# Patient Record
Sex: FEMALE | Race: OTHER | Employment: OTHER | URBAN - METROPOLITAN AREA
[De-identification: names, ages, dates, MRNs, and addresses within clinical notes are randomized per-mention and may not be internally consistent; named-entity substitution may affect disease eponyms.]

---

## 2017-04-10 ENCOUNTER — OFFICE VISIT (OUTPATIENT)
Dept: OBGYN CLINIC | Facility: CLINIC | Age: 62
End: 2017-04-10

## 2017-04-10 VITALS
DIASTOLIC BLOOD PRESSURE: 78 MMHG | SYSTOLIC BLOOD PRESSURE: 102 MMHG | BODY MASS INDEX: 28.64 KG/M2 | WEIGHT: 174 LBS | HEART RATE: 71 BPM | HEIGHT: 65.25 IN

## 2017-04-10 DIAGNOSIS — Z13.220 SCREENING CHOLESTEROL LEVEL: ICD-10-CM

## 2017-04-10 DIAGNOSIS — Z12.4 CERVICAL CANCER SCREENING: ICD-10-CM

## 2017-04-10 DIAGNOSIS — Z01.419 WELL FEMALE EXAM WITH ROUTINE GYNECOLOGICAL EXAM: Primary | ICD-10-CM

## 2017-04-10 DIAGNOSIS — Z13.0 SCREENING FOR DEFICIENCY ANEMIA: ICD-10-CM

## 2017-04-10 DIAGNOSIS — Z13.1 SCREENING FOR DIABETES MELLITUS: ICD-10-CM

## 2017-04-10 DIAGNOSIS — L29.3 PERINEAL ITCHING, FEMALE: ICD-10-CM

## 2017-04-10 PROCEDURE — 88175 CYTOPATH C/V AUTO FLUID REDO: CPT | Performed by: OBSTETRICS & GYNECOLOGY

## 2017-04-10 PROCEDURE — 99386 PREV VISIT NEW AGE 40-64: CPT | Performed by: OBSTETRICS & GYNECOLOGY

## 2017-04-10 PROCEDURE — 87624 HPV HI-RISK TYP POOLED RSLT: CPT | Performed by: OBSTETRICS & GYNECOLOGY

## 2017-04-10 RX ORDER — MINOCYCLINE HYDROCHLORIDE 100 MG/1
CAPSULE ORAL
Refills: 1 | COMMUNITY
Start: 2017-02-07 | End: 2018-06-01

## 2017-04-10 RX ORDER — OLOPATADINE HYDROCHLORIDE 7 MG/ML
SOLUTION OPHTHALMIC
Refills: 6 | COMMUNITY
Start: 2017-02-13 | End: 2019-02-28

## 2017-04-10 RX ORDER — CLINDAMYCIN PHOSPHATE 11.9 MG/ML
SOLUTION TOPICAL
Refills: 0 | COMMUNITY
Start: 2017-04-04 | End: 2017-12-14 | Stop reason: ALTCHOICE

## 2017-04-10 RX ORDER — CLOTRIMAZOLE AND BETAMETHASONE DIPROPIONATE 10; .64 MG/G; MG/G
1 CREAM TOPICAL 2 TIMES DAILY
Qty: 45 G | Refills: 2 | Status: SHIPPED | OUTPATIENT
Start: 2017-04-10 | End: 2018-06-01

## 2017-04-10 RX ORDER — CLOBETASOL PROPIONATE 0.46 MG/ML
SOLUTION TOPICAL
Refills: 2 | COMMUNITY
Start: 2017-04-04 | End: 2018-06-01

## 2017-04-10 NOTE — PROGRESS NOTES
GYN H&P     4/10/2017  2:16 PM    CC: Patient is here for annual    HPI: patient is a [address] old  here for her annual gyn exam.   She has no complaints except perineal itching especially when warm/sweating.  Denies any pelvic pain or irregular vagin runny nose, epistaxis, throat pain or sore throat  Respiratory: denies SOB, dyspnea, cough or wheezing  Cardiovascular: denies chest pain, palpitations, exercise intolerance   GI: denies abdominal pain, diarrhea, constipation  : no complaints, denies dys without edema        A/P: Patient is 63year old female with no complaints. Here for well woman exam.   1. Well female exam with routine gynecological exam    2. Cervical cancer screening    3. Perineal itching, female    4.  Screening for deficiency anemia

## 2017-04-10 NOTE — PROGRESS NOTES
GYN H&P     4/10/2017  2:13 PM    CC: Patient is here for ***    HPI: patient is a 63year old  here for her annual gyn exam.   She has no*** complaints. Menses are regular. Denies any pelvic pain or irregular vaginal discharge.    Contraception: *** dyspnea, cough or wheezing  Cardiovascular: denies chest pain, palpitations, exercise intolerance   GI: denies abdominal pain, diarrhea, constipation  : no complaints***, denies dysuria, increased urinary frequency.  Menses ***regular, no dysmenorrhea, no exam.   1. Well female exam with routine gynecological exam    2. Cervical cancer screening    3. Perineal itching, female        Patient counseled on diet/exercise       1.  Well female exam with routine gynecological exam  ***  - MECCA DIGITAL SCRN BILAT W/

## 2017-04-12 NOTE — PROGRESS NOTES
Quick Note:    Normal pap, negative HPV. Patient notified via confidential voice mail.  F/U 1 year or prn.  ______

## 2017-04-19 ENCOUNTER — LAB ENCOUNTER (OUTPATIENT)
Dept: LAB | Age: 62
End: 2017-04-19
Attending: OBSTETRICS & GYNECOLOGY
Payer: COMMERCIAL

## 2017-04-19 ENCOUNTER — HOSPITAL ENCOUNTER (OUTPATIENT)
Dept: MAMMOGRAPHY | Age: 62
Discharge: HOME | End: 2017-04-19
Attending: OBSTETRICS & GYNECOLOGY
Payer: COMMERCIAL

## 2017-04-19 DIAGNOSIS — Z01.419 WELL FEMALE EXAM WITH ROUTINE GYNECOLOGICAL EXAM: ICD-10-CM

## 2017-04-19 DIAGNOSIS — Z13.1 SCREENING FOR DIABETES MELLITUS: ICD-10-CM

## 2017-04-19 DIAGNOSIS — Z13.0 SCREENING FOR DEFICIENCY ANEMIA: ICD-10-CM

## 2017-04-19 DIAGNOSIS — Z13.220 SCREENING CHOLESTEROL LEVEL: ICD-10-CM

## 2017-04-19 PROCEDURE — 80061 LIPID PANEL: CPT

## 2017-04-19 PROCEDURE — 36415 COLL VENOUS BLD VENIPUNCTURE: CPT

## 2017-04-19 PROCEDURE — 80053 COMPREHEN METABOLIC PANEL: CPT

## 2017-04-19 PROCEDURE — 77067 SCR MAMMO BI INCL CAD: CPT

## 2017-04-19 PROCEDURE — 85025 COMPLETE CBC W/AUTO DIFF WBC: CPT

## 2017-04-19 NOTE — PROGRESS NOTES
Quick Note:    Patient notified of results and recommendations.  Patient looking for PCP, names given.  ______

## 2017-04-28 ENCOUNTER — HOSPITAL ENCOUNTER (OUTPATIENT)
Dept: ULTRASOUND IMAGING | Age: 62
Discharge: HOME | End: 2017-04-28
Attending: OBSTETRICS & GYNECOLOGY
Payer: COMMERCIAL

## 2017-04-28 ENCOUNTER — HOSPITAL ENCOUNTER (OUTPATIENT)
Dept: MAMMOGRAPHY | Age: 62
Discharge: HOME | End: 2017-04-28
Attending: OBSTETRICS & GYNECOLOGY
Payer: COMMERCIAL

## 2017-04-28 DIAGNOSIS — R92.8 ABNORMAL MAMMOGRAM OF LEFT BREAST: ICD-10-CM

## 2017-04-28 PROCEDURE — 77061 BREAST TOMOSYNTHESIS UNI: CPT

## 2017-04-28 PROCEDURE — 76642 ULTRASOUND BREAST LIMITED: CPT

## 2017-04-28 PROCEDURE — 77065 DX MAMMO INCL CAD UNI: CPT

## 2017-12-14 ENCOUNTER — OFFICE VISIT (OUTPATIENT)
Dept: FAMILY MEDICINE CLINIC | Facility: CLINIC | Age: 62
End: 2017-12-14

## 2017-12-14 VITALS
RESPIRATION RATE: 16 BRPM | SYSTOLIC BLOOD PRESSURE: 108 MMHG | HEART RATE: 72 BPM | DIASTOLIC BLOOD PRESSURE: 70 MMHG | BODY MASS INDEX: 30.45 KG/M2 | TEMPERATURE: 97 F | HEIGHT: 65.25 IN | WEIGHT: 185 LBS

## 2017-12-14 DIAGNOSIS — N95.1 HOT FLASHES DUE TO MENOPAUSE: ICD-10-CM

## 2017-12-14 DIAGNOSIS — H81.13 BPPV (BENIGN PAROXYSMAL POSITIONAL VERTIGO), BILATERAL: Primary | ICD-10-CM

## 2017-12-14 DIAGNOSIS — J30.89 CHRONIC NON-SEASONAL ALLERGIC RHINITIS, UNSPECIFIED TRIGGER: ICD-10-CM

## 2017-12-14 PROCEDURE — 99204 OFFICE O/P NEW MOD 45 MIN: CPT | Performed by: FAMILY MEDICINE

## 2017-12-14 NOTE — PROGRESS NOTES
Patient presents with:  Dizziness: feels like the room is spinning off & on x 3 wks  Runny Nose: constant runny nose for over a month. Tried OTC Claritin with no relief. Imm/Inj: declines the flu vaccine today.       HPI:  Jasbir Aggarwal is a 63year old Smoking status: Never Smoker    Smokeless tobacco: Never Used    Alcohol use Yes  0.0 oz/week     Comment: 1 x week    Drug use: No    Sexual activity: Yes    Partners: Male     Other Topics Concern    Caffeine Concern Yes    Comment: 1 cup of coffee x da 3. Take Allegra (fexofenadine) or Zyrtec (ceterizine) for pills. May take in combination with a nasal steroid over the counter like Flonase (fluticasone)  4. Wash your sheets, dust regularly, change your furnace filter with each season    5.  Dress in layer The healthcare provider will ask about your symptoms and your medical history. He or she will examine you. You may have hearing and balance tests. As part of the exam, your healthcare provider may have you move your head and body in certain ways.  If you ha

## 2017-12-14 NOTE — PATIENT INSTRUCTIONS
1. Limit movement of your head, move slowly  2. Do Epley Maneuver to help your vertigo    3. Take Allegra (fexofenadine) or Zyrtec (ceterizine) for pills. May take in combination with a nasal steroid over the counter like Flonase (fluticasone)  4.  Wash you medical history. He or she will examine you. You may have hearing and balance tests. As part of the exam, your healthcare provider may have you move your head and body in certain ways. If you have BPPV, the movements can bring on vertigo.  Your provider capo

## 2018-01-26 ENCOUNTER — IMMUNIZATION (OUTPATIENT)
Dept: FAMILY MEDICINE CLINIC | Facility: CLINIC | Age: 63
End: 2018-01-26

## 2018-01-26 DIAGNOSIS — Z23 NEED FOR VACCINATION: ICD-10-CM

## 2018-01-26 PROCEDURE — 90471 IMMUNIZATION ADMIN: CPT | Performed by: NURSE PRACTITIONER

## 2018-01-26 PROCEDURE — 90686 IIV4 VACC NO PRSV 0.5 ML IM: CPT | Performed by: NURSE PRACTITIONER

## 2018-01-29 ENCOUNTER — MED REC SCAN ONLY (OUTPATIENT)
Dept: FAMILY MEDICINE CLINIC | Facility: CLINIC | Age: 63
End: 2018-01-29

## 2018-06-01 ENCOUNTER — OFFICE VISIT (OUTPATIENT)
Dept: OBGYN CLINIC | Facility: CLINIC | Age: 63
End: 2018-06-01

## 2018-06-01 ENCOUNTER — LAB ENCOUNTER (OUTPATIENT)
Dept: LAB | Age: 63
End: 2018-06-01
Attending: OBSTETRICS & GYNECOLOGY
Payer: COMMERCIAL

## 2018-06-01 VITALS
HEIGHT: 66 IN | BODY MASS INDEX: 30.7 KG/M2 | SYSTOLIC BLOOD PRESSURE: 110 MMHG | WEIGHT: 191 LBS | DIASTOLIC BLOOD PRESSURE: 80 MMHG

## 2018-06-01 DIAGNOSIS — Z12.39 BREAST CANCER SCREENING: ICD-10-CM

## 2018-06-01 DIAGNOSIS — Z13.9 SCREENING FOR CONDITION: ICD-10-CM

## 2018-06-01 DIAGNOSIS — L29.3 PERINEAL ITCHING, FEMALE: ICD-10-CM

## 2018-06-01 DIAGNOSIS — Z12.4 CERVICAL CANCER SCREENING: ICD-10-CM

## 2018-06-01 DIAGNOSIS — Z01.419 WELL FEMALE EXAM WITH ROUTINE GYNECOLOGICAL EXAM: Primary | ICD-10-CM

## 2018-06-01 PROCEDURE — 36415 COLL VENOUS BLD VENIPUNCTURE: CPT

## 2018-06-01 PROCEDURE — 88175 CYTOPATH C/V AUTO FLUID REDO: CPT | Performed by: OBSTETRICS & GYNECOLOGY

## 2018-06-01 PROCEDURE — 99396 PREV VISIT EST AGE 40-64: CPT | Performed by: OBSTETRICS & GYNECOLOGY

## 2018-06-01 PROCEDURE — 82306 VITAMIN D 25 HYDROXY: CPT

## 2018-06-01 RX ORDER — CLOTRIMAZOLE AND BETAMETHASONE DIPROPIONATE 10; .64 MG/G; MG/G
1 CREAM TOPICAL 2 TIMES DAILY
Qty: 45 G | Refills: 2 | Status: SHIPPED | OUTPATIENT
Start: 2018-06-01 | End: 2019-02-28

## 2018-06-01 NOTE — PROGRESS NOTES
GYN H&P     2018  9:21 AM    CC: Patient is here for annual    HPI: patient is a 63year old  here for her annual gyn exam.   She has no complaints. Menopausal, Denies any pelvic pain or irregular vaginal discharge.    Contraception: maurice GAONA changes, denies headaches  ENT: no complaints, denies earaches, runny nose, epistaxis, throat pain or sore throat  Respiratory: denies SOB, dyspnea, cough or wheezing  Cardiovascular: denies chest pain, palpitations, exercise intolerance   GI: denies abdom tender without edema        A/P: Patient is 63year old female with no complaints. Here for well woman exam.   1. Well female exam with routine gynecological exam    2. Breast cancer screening    3. Cervical cancer screening    4.  Perineal itching, female

## 2018-06-13 ENCOUNTER — HOSPITAL ENCOUNTER (OUTPATIENT)
Dept: MAMMOGRAPHY | Age: 63
Discharge: HOME | End: 2018-06-13
Attending: OBSTETRICS & GYNECOLOGY
Payer: COMMERCIAL

## 2018-06-13 DIAGNOSIS — Z01.419 WELL FEMALE EXAM WITH ROUTINE GYNECOLOGICAL EXAM: ICD-10-CM

## 2018-06-13 DIAGNOSIS — Z12.39 BREAST CANCER SCREENING: ICD-10-CM

## 2018-06-13 PROCEDURE — 77067 SCR MAMMO BI INCL CAD: CPT | Performed by: OBSTETRICS & GYNECOLOGY

## 2018-06-13 PROCEDURE — 77063 BREAST TOMOSYNTHESIS BI: CPT | Performed by: OBSTETRICS & GYNECOLOGY

## 2018-11-21 ENCOUNTER — OFFICE VISIT (OUTPATIENT)
Dept: FAMILY MEDICINE CLINIC | Facility: CLINIC | Age: 63
End: 2018-11-21
Payer: COMMERCIAL

## 2018-11-21 VITALS
OXYGEN SATURATION: 97 % | BODY MASS INDEX: 31.82 KG/M2 | HEIGHT: 66 IN | SYSTOLIC BLOOD PRESSURE: 102 MMHG | DIASTOLIC BLOOD PRESSURE: 58 MMHG | TEMPERATURE: 98 F | WEIGHT: 198 LBS | HEART RATE: 68 BPM | RESPIRATION RATE: 18 BRPM

## 2018-11-21 DIAGNOSIS — R42 VERTIGO: ICD-10-CM

## 2018-11-21 DIAGNOSIS — J01.40 ACUTE PANSINUSITIS, RECURRENCE NOT SPECIFIED: Primary | ICD-10-CM

## 2018-11-21 PROCEDURE — 99213 OFFICE O/P EST LOW 20 MIN: CPT | Performed by: PHYSICIAN ASSISTANT

## 2018-11-21 RX ORDER — DOXYCYCLINE HYCLATE 100 MG/1
100 CAPSULE ORAL 2 TIMES DAILY
Qty: 14 CAPSULE | Refills: 0 | Status: SHIPPED | OUTPATIENT
Start: 2018-11-21 | End: 2018-11-28

## 2018-11-21 RX ORDER — MECLIZINE HYDROCHLORIDE CHEWABLE TABLETS 25 MG/1
25 TABLET, CHEWABLE ORAL 3 TIMES DAILY PRN
Qty: 15 TABLET | Refills: 0 | Status: SHIPPED | OUTPATIENT
Start: 2018-11-21 | End: 2019-02-28

## 2018-11-21 RX ORDER — METHYLPREDNISOLONE 4 MG/1
TABLET ORAL
Qty: 1 PACKAGE | Refills: 0 | Status: SHIPPED | OUTPATIENT
Start: 2018-11-21 | End: 2019-02-28

## 2018-11-21 NOTE — PATIENT INSTRUCTIONS
1. Doxycycline  2. Medrol- No NSAIDs  3. Meclizine  4. Follow up with PCP in 48 hours  5. If worsening symptoms to ER  6.  Continue excercises    Benign Paroxysmal Positional Vertigo/ Sinusitis     Your health care provider may move your head in certain w The healthcare provider will ask about your symptoms and your medical history. He or she will examine you. You may have hearing and balance tests. As part of the exam, your healthcare provider may have you move your head and body in certain ways.  If you ha The sinuses are air-filled spaces within the bones of the face. They connect to the inside of the nose. Sinusitis is an inflammation of the tissue that lines the sinuses. Sinusitis can occur during a cold.  It can also happen due to allergies to pollens and · Do not use nasal rinses or irrigation during an acute sinus infection, unless your healthcare provider tells you to. Rinsing may spread the infection to other areas in your sinuses.   · Use acetaminophen or ibuprofen to control pain, unless another pain m

## 2018-11-21 NOTE — PROGRESS NOTES
CHIEF COMPLAINT:     Patient presents with:  Vertigo: X 1-6 day  Nasal Congestion: Sinus pressure, left ear pain, left side of the neck swelling, X 6 days       HPI:   Mag James is a 63year old female who presents with complaints of nasal congestion • Hemorrhoids    • Wears glasses       Social History:  Social History    Tobacco Use      Smoking status: Never Smoker      Smokeless tobacco: Never Used    Alcohol use:  Yes      Alcohol/week: 0.0 oz      Comment: 1 x week    Drug use: No       REVIEW OF NEURO: A&Ox3. CN II-XII intact. No focal deficits. Coordination and Gait normal.  Kernig and Brudzinski's are negative. Patient does have previous history of BPPV previously treated by PCP with Flonase, antihistamine, and exercises.      ASSESSMENT AN Your primary healthcare provider may diagnose and treat your BPPV. Or you may see an ear, nose, and throat doctor (otolaryngologist). In some cases, you may see a nervous system doctor (neurologist).   The healthcare provider will ask about your symptoms an The sinuses are air-filled spaces within the bones of the face. They connect to the inside of the nose. Sinusitis is an inflammation of the tissue that lines the sinuses. Sinusitis can occur during a cold.  It can also happen due to allergies to pollens and · Do not use nasal rinses or irrigation during an acute sinus infection, unless your healthcare provider tells you to. Rinsing may spread the infection to other areas in your sinuses.   · Use acetaminophen or ibuprofen to control pain, unless another pain m

## 2019-01-14 ENCOUNTER — TELEPHONE (OUTPATIENT)
Dept: OBGYN CLINIC | Facility: CLINIC | Age: 64
End: 2019-01-14

## 2019-01-14 DIAGNOSIS — N95.0 POST-MENOPAUSE BLEEDING: Primary | ICD-10-CM

## 2019-01-14 NOTE — TELEPHONE ENCOUNTER
Patient informed. Verbalized understanding.   My chart message with central scheduling number sent to patient per her request.

## 2019-01-14 NOTE — TELEPHONE ENCOUNTER
Recommend pelvic ultrasound (with [facility]) at pt convenience  And   Then appropriates follow up visit after that

## 2019-01-14 NOTE — TELEPHONE ENCOUNTER
Pt noticed bloody discharge on Jan 9th last only 1 day. Felt slight cramping for couple days after. Pt wanting to be seen today.

## 2019-01-14 NOTE — TELEPHONE ENCOUNTER
67 y/o called c/o one day PMB. She states she went to the washroom on 01/09/19 and when she wiped she had bleeding. It only happened one time, the rest of the day she felt like she did when she had her menses. She stated she had some cramping as well.  Stat

## 2019-01-20 ENCOUNTER — HOSPITAL ENCOUNTER (OUTPATIENT)
Dept: ULTRASOUND IMAGING | Age: 64
Discharge: HOME | End: 2019-01-20
Attending: OBSTETRICS & GYNECOLOGY
Payer: COMMERCIAL

## 2019-01-20 DIAGNOSIS — N95.0 POST-MENOPAUSE BLEEDING: ICD-10-CM

## 2019-01-20 PROCEDURE — 76830 TRANSVAGINAL US NON-OB: CPT | Performed by: OBSTETRICS & GYNECOLOGY

## 2019-01-20 PROCEDURE — 76856 US EXAM PELVIC COMPLETE: CPT | Performed by: OBSTETRICS & GYNECOLOGY

## 2019-01-29 NOTE — PROGRESS NOTES
Patient informed of results. Verbalized understanding. No further questions or concerns. Call transferred to [facility] to schedule.

## 2019-02-28 ENCOUNTER — OFFICE VISIT (OUTPATIENT)
Dept: OBGYN CLINIC | Facility: CLINIC | Age: 64
End: 2019-02-28
Payer: COMMERCIAL

## 2019-02-28 VITALS
DIASTOLIC BLOOD PRESSURE: 80 MMHG | WEIGHT: 196 LBS | HEIGHT: 66 IN | BODY MASS INDEX: 31.5 KG/M2 | SYSTOLIC BLOOD PRESSURE: 124 MMHG

## 2019-02-28 DIAGNOSIS — N95.0 POSTMENOPAUSAL BLEEDING: Primary | ICD-10-CM

## 2019-02-28 PROCEDURE — 88305 TISSUE EXAM BY PATHOLOGIST: CPT | Performed by: OBSTETRICS & GYNECOLOGY

## 2019-02-28 PROCEDURE — 58100 BIOPSY OF UTERUS LINING: CPT | Performed by: OBSTETRICS & GYNECOLOGY

## 2019-02-28 RX ORDER — LIDOCAINE HYDROCHLORIDE 10 MG/ML
10 INJECTION, SOLUTION INFILTRATION; PERINEURAL ONCE
Status: COMPLETED | OUTPATIENT
Start: 2019-02-28 | End: 2019-02-28

## 2019-02-28 RX ADMIN — LIDOCAINE HYDROCHLORIDE 10 ML: 10 INJECTION, SOLUTION INFILTRATION; PERINEURAL at 13:34:00

## 2019-02-28 NOTE — PROGRESS NOTES
2Endometrial Biopsy    Pre-Procedure Care:   Consent was obtained. Procedure/risks were explained. Questions were answered. Correct patient was identified. Correct side and site were confirmed.     Pregnancy Results: negative from menopause   Birth cont

## 2019-04-10 ENCOUNTER — LAB ENCOUNTER (OUTPATIENT)
Dept: LAB | Age: 64
End: 2019-04-10
Attending: FAMILY MEDICINE
Payer: COMMERCIAL

## 2019-04-10 ENCOUNTER — OFFICE VISIT (OUTPATIENT)
Dept: FAMILY MEDICINE CLINIC | Facility: CLINIC | Age: 64
End: 2019-04-10
Payer: COMMERCIAL

## 2019-04-10 VITALS
HEART RATE: 68 BPM | HEIGHT: 66 IN | BODY MASS INDEX: 31.34 KG/M2 | TEMPERATURE: 98 F | SYSTOLIC BLOOD PRESSURE: 122 MMHG | RESPIRATION RATE: 16 BRPM | WEIGHT: 195 LBS | DIASTOLIC BLOOD PRESSURE: 78 MMHG

## 2019-04-10 DIAGNOSIS — E55.9 VITAMIN D INSUFFICIENCY: ICD-10-CM

## 2019-04-10 DIAGNOSIS — Z13.220 SCREENING CHOLESTEROL LEVEL: ICD-10-CM

## 2019-04-10 DIAGNOSIS — J30.1 SEASONAL ALLERGIC RHINITIS DUE TO POLLEN: ICD-10-CM

## 2019-04-10 DIAGNOSIS — K20.90 ESOPHAGITIS, UNSPECIFIED: ICD-10-CM

## 2019-04-10 DIAGNOSIS — J06.9 VIRAL URI: Primary | ICD-10-CM

## 2019-04-10 DIAGNOSIS — H10.13 ALLERGIC CONJUNCTIVITIS OF BOTH EYES: ICD-10-CM

## 2019-04-10 PROCEDURE — 82306 VITAMIN D 25 HYDROXY: CPT | Performed by: FAMILY MEDICINE

## 2019-04-10 PROCEDURE — 80053 COMPREHEN METABOLIC PANEL: CPT | Performed by: FAMILY MEDICINE

## 2019-04-10 PROCEDURE — 85025 COMPLETE CBC W/AUTO DIFF WBC: CPT | Performed by: FAMILY MEDICINE

## 2019-04-10 PROCEDURE — 99213 OFFICE O/P EST LOW 20 MIN: CPT | Performed by: FAMILY MEDICINE

## 2019-04-10 PROCEDURE — 80061 LIPID PANEL: CPT | Performed by: FAMILY MEDICINE

## 2019-04-10 PROCEDURE — 36415 COLL VENOUS BLD VENIPUNCTURE: CPT | Performed by: FAMILY MEDICINE

## 2019-04-10 RX ORDER — OLOPATADINE HYDROCHLORIDE 2 MG/ML
1 SOLUTION/ DROPS OPHTHALMIC 2 TIMES DAILY PRN
Qty: 1 BOTTLE | Refills: 0 | Status: SHIPPED | OUTPATIENT
Start: 2019-04-10 | End: 2019-10-27 | Stop reason: ALTCHOICE

## 2019-04-10 RX ORDER — FLUTICASONE PROPIONATE 50 MCG
2 SPRAY, SUSPENSION (ML) NASAL DAILY
Qty: 3 BOTTLE | Refills: 3 | Status: SHIPPED | OUTPATIENT
Start: 2019-04-10 | End: 2020-04-04

## 2019-04-10 NOTE — PROGRESS NOTES
[facility] Visit Note  4/10/2019      Subjective:      Patient ID: Cherise Hendricks is a 66year old female.     Chief Complaint:  Patient presents with:  Sinus Problem: x 3-4 days c/o having sinus pressure, drainage down back of her throat,  heada Dispense: 1 Bottle; Refill: 0  - Fluticasone Propionate 50 MCG/ACT Nasal Suspension; 2 sprays by Each Nare route daily. Dispense: 3 Bottle; Refill: 3  -may add decongestant    4. Vitamin D insufficiency  - VITAMIN D, 25-HYDROXY; Future    5.  Esophagitis,

## 2019-04-11 PROBLEM — E78.2 MIXED HYPERLIPIDEMIA: Status: ACTIVE | Noted: 2019-01-01

## 2019-06-12 ENCOUNTER — OFFICE VISIT (OUTPATIENT)
Dept: FAMILY MEDICINE CLINIC | Facility: CLINIC | Age: 64
End: 2019-06-12
Payer: COMMERCIAL

## 2019-06-12 VITALS — SYSTOLIC BLOOD PRESSURE: 138 MMHG | DIASTOLIC BLOOD PRESSURE: 82 MMHG

## 2019-06-12 DIAGNOSIS — Z01.30 BP CHECK: Primary | ICD-10-CM

## 2019-10-27 ENCOUNTER — OFFICE VISIT (OUTPATIENT)
Dept: FAMILY MEDICINE CLINIC | Facility: CLINIC | Age: 64
End: 2019-10-27
Payer: COMMERCIAL

## 2019-10-27 VITALS
OXYGEN SATURATION: 97 % | WEIGHT: 199 LBS | RESPIRATION RATE: 20 BRPM | BODY MASS INDEX: 31.98 KG/M2 | HEIGHT: 66 IN | TEMPERATURE: 99 F | DIASTOLIC BLOOD PRESSURE: 82 MMHG | SYSTOLIC BLOOD PRESSURE: 148 MMHG | HEART RATE: 78 BPM

## 2019-10-27 DIAGNOSIS — J06.9 VIRAL URI WITH COUGH: Primary | ICD-10-CM

## 2019-10-27 PROCEDURE — 99213 OFFICE O/P EST LOW 20 MIN: CPT | Performed by: NURSE PRACTITIONER

## 2019-10-27 NOTE — PROGRESS NOTES
CHIEF COMPLAINT:   Patient presents with:  Nasal Congestion: Head pressure, bilateral ear pain, coughing up flem, post nasal drip, bad taste in the month, runny/stuffy nose one and off X  5 days      HPI:   Kati Gillespie is a 66year old female who prese EARS: TM's normal, no bulging, no retraction,+ clear fluid to left, bony landmarks obscured  NOSE: Nostrils patent, no nasal discharge, nasal mucosa pink   THROAT: Oral mucosa pink, moist. Posterior pharynx is slightly erythematous. no exudates.  Tonsils 1/ · If symptoms are severe, rest at home for the first 2 to 3 days. When you resume activity, don't let yourself get too tired. · Don't smoke. If you need help stopping, talk with your healthcare provider.   · Avoid being exposed to cigarette smoke (yours or Date Last Reviewed: 6/1/2018  © 3224-3398 [employer] [address]. [address] [facility], [address] [place]. All rights reserved. This information is not intended as a substitute for professional medical care.  Always follow your healthcare professional'

## 2019-10-27 NOTE — PATIENT INSTRUCTIONS
Tylenol and ibuprofen for comfort. Dimetapp every 4 hours as needed. Increase fluids and rest!  Viral Upper Respiratory Illness (Adult)    You have a viral upper respiratory illness (URI), which is another term for the common cold.  This illness is contag · Over-the-counter cold medicines will not shorten the length of time you’re sick, but they may be helpful for the following symptoms: cough, sore throat, and nasal and sinus congestion.  If you take prescription medicines, ask your healthcare provider or p

## 2019-11-12 ENCOUNTER — APPOINTMENT (OUTPATIENT)
Dept: GENERAL RADIOLOGY | Age: 64
End: 2019-11-12
Attending: EMERGENCY MEDICINE
Payer: COMMERCIAL

## 2019-11-12 ENCOUNTER — HOSPITAL ENCOUNTER (EMERGENCY)
Age: 64
Discharge: HOME | End: 2019-11-12
Attending: EMERGENCY MEDICINE
Payer: COMMERCIAL

## 2019-11-12 VITALS
WEIGHT: 198 LBS | HEART RATE: 76 BPM | SYSTOLIC BLOOD PRESSURE: 143 MMHG | OXYGEN SATURATION: 100 % | BODY MASS INDEX: 31.82 KG/M2 | HEIGHT: 66 IN | RESPIRATION RATE: 16 BRPM | TEMPERATURE: 98 F | DIASTOLIC BLOOD PRESSURE: 72 MMHG

## 2019-11-12 DIAGNOSIS — S40.011A CONTUSION OF RIGHT SHOULDER, INITIAL ENCOUNTER: Primary | ICD-10-CM

## 2019-11-12 PROCEDURE — 73060 X-RAY EXAM OF HUMERUS: CPT | Performed by: EMERGENCY MEDICINE

## 2019-11-12 PROCEDURE — 99283 EMERGENCY DEPT VISIT LOW MDM: CPT

## 2019-11-12 RX ORDER — IBUPROFEN 600 MG/1
600 TABLET ORAL ONCE
Status: COMPLETED | OUTPATIENT
Start: 2019-11-12 | End: 2019-11-12

## 2019-11-13 NOTE — ED PROVIDER NOTES
Patient Seen in: THE [facility] In [place]      History   Patient presents with:  Upper Extremity Injury (musculoskeletal)    Stated Complaint: right shoulder injury after a fall    HPI    80-year-old female complaint of right shoulder pa distally. ED Course   Labs Reviewed - No data to display       X-rays negative.         MDM     Patient was reexamined after the x-ray was negative she does have difficulty abducting the arm past 30 degrees and a positive drop arm test positive empty bee

## 2019-11-13 NOTE — ED INITIAL ASSESSMENT (HPI)
Pt states tripped in the garage and caught fall with right hand, co hand pain and right shoulder, denies head injury

## 2019-11-26 ENCOUNTER — HOSPITAL ENCOUNTER (OUTPATIENT)
Dept: MRI IMAGING | Age: 64
Discharge: HOME | End: 2019-11-26
Attending: ORTHOPAEDIC SURGERY
Payer: COMMERCIAL

## 2019-11-26 DIAGNOSIS — S49.91XA INJURY OF RIGHT SHOULDER, INITIAL ENCOUNTER: ICD-10-CM

## 2019-11-26 DIAGNOSIS — S43.421A SPRAIN OF RIGHT ROTATOR CUFF CAPSULE, INITIAL ENCOUNTER: ICD-10-CM

## 2019-11-26 PROCEDURE — 73221 MRI JOINT UPR EXTREM W/O DYE: CPT | Performed by: ORTHOPAEDIC SURGERY

## 2019-12-09 ENCOUNTER — OFFICE VISIT (OUTPATIENT)
Dept: OBGYN CLINIC | Facility: CLINIC | Age: 64
End: 2019-12-09
Payer: COMMERCIAL

## 2019-12-09 VITALS
DIASTOLIC BLOOD PRESSURE: 86 MMHG | HEIGHT: 65.5 IN | WEIGHT: 198 LBS | SYSTOLIC BLOOD PRESSURE: 122 MMHG | BODY MASS INDEX: 32.59 KG/M2

## 2019-12-09 DIAGNOSIS — Z12.39 BREAST CANCER SCREENING: ICD-10-CM

## 2019-12-09 DIAGNOSIS — Z01.419 WELL FEMALE EXAM WITH ROUTINE GYNECOLOGICAL EXAM: Primary | ICD-10-CM

## 2019-12-09 DIAGNOSIS — Z12.4 CERVICAL CANCER SCREENING: ICD-10-CM

## 2019-12-09 PROCEDURE — 99396 PREV VISIT EST AGE 40-64: CPT | Performed by: OBSTETRICS & GYNECOLOGY

## 2019-12-09 PROCEDURE — 87624 HPV HI-RISK TYP POOLED RSLT: CPT | Performed by: OBSTETRICS & GYNECOLOGY

## 2019-12-09 PROCEDURE — 88175 CYTOPATH C/V AUTO FLUID REDO: CPT | Performed by: OBSTETRICS & GYNECOLOGY

## 2019-12-09 NOTE — PROGRESS NOTES
GYN H&P     2019  1:46 PM    CC: Patient is here for annual    HPI: patient is a 66year old  here for her annual gyn exam.   She has no complaints.  passed away in July. No postmenopausal bleeding.  Denies any pelvic pain or irregular vag Not on file    Tobacco Use      Smoking status: Never Smoker      Smokeless tobacco: Never Used    Substance and Sexual Activity      Alcohol use: Not Currently        Alcohol/week: 0.0 standard drinks        Comment: Rarely       Drug use: No      Sexual constipation  : no complaints, denies dysuria, increased urinary frequency.  Menses have stopped, no dysmenorrhea, no menorrhagia, no dyspareunia   Hematological/lymphatic: denies history of excessive bleeding or bruising, denies dizziness, lightheadednes on diet/exercise       1. Well female exam with routine gynecological exam    - THINPREP PAP SMEAR B; Future  - HPV HIGH RISK , THIN PREP COLLECTION; Future  - MECCA SCREENING KIKI LABOY (CPT=77067); Future    2.  Cervical cancer screening    - THINPREP PAP

## 2019-12-10 ENCOUNTER — HOSPITAL ENCOUNTER (OUTPATIENT)
Dept: MAMMOGRAPHY | Age: 64
Discharge: HOME | End: 2019-12-10
Attending: OBSTETRICS & GYNECOLOGY
Payer: COMMERCIAL

## 2019-12-10 DIAGNOSIS — Z12.39 BREAST CANCER SCREENING: ICD-10-CM

## 2019-12-10 DIAGNOSIS — Z01.419 WELL FEMALE EXAM WITH ROUTINE GYNECOLOGICAL EXAM: ICD-10-CM

## 2019-12-10 PROCEDURE — 77067 SCR MAMMO BI INCL CAD: CPT | Performed by: OBSTETRICS & GYNECOLOGY

## 2020-09-01 ENCOUNTER — PATIENT MESSAGE (OUTPATIENT)
Dept: FAMILY MEDICINE CLINIC | Facility: CLINIC | Age: 65
End: 2020-09-01

## 2020-09-01 ENCOUNTER — VIRTUAL PHONE E/M (OUTPATIENT)
Dept: FAMILY MEDICINE CLINIC | Facility: CLINIC | Age: 65
End: 2020-09-01

## 2020-09-01 DIAGNOSIS — J06.9 VIRAL URI WITH COUGH: Primary | ICD-10-CM

## 2020-09-01 PROCEDURE — 99213 OFFICE O/P EST LOW 20 MIN: CPT | Performed by: FAMILY MEDICINE

## 2020-09-01 NOTE — TELEPHONE ENCOUNTER
From: Neftaly Braxton  To: Issac Salomon MD  Sent: 9/1/2020 2:23 PM CDT  Subject: Other    Hello Doctor,  Just wanted to know, can a Covid test be false negative? My son's doctor mention that to him.  He's been sick since Tuesday of last week and is s

## 2020-09-01 NOTE — PROGRESS NOTES
Virtual Telephone Check-In    Jasbir Aggarwal verbally consents to a Virtual/Telephone Check-In visit on  09/01/20. Patient understands and accepts financial responsibility for any deductible, co-insurance and/or co-pays associated with this service. due to the ongoing public health crisis/national emergency and because of restrictions of visitation. There are limitations of this visit as physical examination was limited.   Appropriate medical decision-making and tests are ordered as detailed in the michelle

## 2020-09-02 ENCOUNTER — VIRTUAL PHONE E/M (OUTPATIENT)
Dept: FAMILY MEDICINE CLINIC | Facility: CLINIC | Age: 65
End: 2020-09-02
Payer: COMMERCIAL

## 2020-09-02 ENCOUNTER — MOBILE ENCOUNTER (OUTPATIENT)
Dept: FAMILY MEDICINE CLINIC | Facility: CLINIC | Age: 65
End: 2020-09-02

## 2020-09-02 ENCOUNTER — TELEPHONE (OUTPATIENT)
Dept: FAMILY MEDICINE CLINIC | Facility: CLINIC | Age: 65
End: 2020-09-02

## 2020-09-02 VITALS — TEMPERATURE: 101 F

## 2020-09-02 DIAGNOSIS — R50.9 FEVER, UNSPECIFIED FEVER CAUSE: Primary | ICD-10-CM

## 2020-09-02 DIAGNOSIS — R05.9 COUGH: ICD-10-CM

## 2020-09-02 PROCEDURE — 99213 OFFICE O/P EST LOW 20 MIN: CPT | Performed by: FAMILY MEDICINE

## 2020-09-02 NOTE — PROGRESS NOTES
Virtual Telephone Check-In    Clara Lynn verbally consents to a Virtual/Telephone Check-In visit on  09/02/20. Patient understands and accepts financial responsibility for any deductible, co-insurance and/or co-pays associated with this service. the ER. Return for call us by noon on day of expected results. Miya Lares MD      Please note that the following visit was completed using two-way, real-time interactive audio communication.  This has been done in [place] to provide c

## 2020-09-02 NOTE — PROGRESS NOTES
On-call patient called around 6:50 AM stating that she had a temperature of 100.4 and had a phone visit with her PCP yesterday and was told if she gets temperature 100.4 to call back as she may give an antibiotic.   I told patient the office will be opening

## 2020-09-02 NOTE — TELEPHONE ENCOUNTER
Future Appointments   Date Time Provider Zahida Joanne   9/2/2020  2:00 PM Dania Colon MD [facility] [address] [facility] [address]     Patient verbally consents to a virtual/telephone check-in service for the date and time noted above.  Patient understands and acce

## 2020-09-03 ENCOUNTER — APPOINTMENT (OUTPATIENT)
Dept: LAB | Age: 65
End: 2020-09-03
Attending: FAMILY MEDICINE
Payer: COMMERCIAL

## 2020-09-03 DIAGNOSIS — R50.9 FEVER, UNSPECIFIED FEVER CAUSE: ICD-10-CM

## 2020-09-03 DIAGNOSIS — R05.9 COUGH: ICD-10-CM

## 2020-09-04 ENCOUNTER — VIRTUAL PHONE E/M (OUTPATIENT)
Dept: FAMILY MEDICINE CLINIC | Facility: CLINIC | Age: 65
End: 2020-09-04
Payer: COMMERCIAL

## 2020-09-04 DIAGNOSIS — R50.9 FEVER, UNSPECIFIED FEVER CAUSE: Primary | ICD-10-CM

## 2020-09-04 LAB — SARS-COV-2 RNA RESP QL NAA+PROBE: DETECTED

## 2020-09-04 PROCEDURE — 99214 OFFICE O/P EST MOD 30 MIN: CPT | Performed by: FAMILY MEDICINE

## 2020-09-04 RX ORDER — AZITHROMYCIN 250 MG/1
TABLET, FILM COATED ORAL
Qty: 6 TABLET | Refills: 0 | Status: SHIPPED | OUTPATIENT
Start: 2020-09-04 | End: 2020-09-09

## 2020-09-04 NOTE — PROGRESS NOTES
Virtual Telephone Check-In    Gareth Alicea verbally consents to a Virtual/Telephone Check-In visit on  09/04/20. Patient understands and accepts financial responsibility for any deductible, co-insurance and/or co-pays associated with this service. sentences comfortably  Psych: normal mood      Assessment/Plan:  1. Fever, unspecified fever cause  - azithromycin (ZITHROMAX Z-STEVE) 250 MG Oral Tab; Take 2 tablets (500 mg total) by mouth daily for 1 day, THEN 1 tablet (250 mg total) daily for 4 days.   [facility]

## 2020-09-05 DIAGNOSIS — U07.1 COVID-19 VIRUS INFECTION: Primary | ICD-10-CM

## 2020-09-05 RX ORDER — DEXTROMETHORPHAN HYDROBROMIDE AND PROMETHAZINE HYDROCHLORIDE 15; 6.25 MG/5ML; MG/5ML
5 SYRUP ORAL 4 TIMES DAILY PRN
Qty: 240 ML | Refills: 0 | Status: SHIPPED | OUTPATIENT
Start: 2020-09-05 | End: 2021-01-07 | Stop reason: ALTCHOICE

## 2020-09-08 ENCOUNTER — OFFICE VISIT (OUTPATIENT)
Dept: FAMILY MEDICINE CLINIC | Facility: CLINIC | Age: 65
End: 2020-09-08
Payer: COMMERCIAL

## 2020-09-08 VITALS — TEMPERATURE: 97 F

## 2020-09-08 DIAGNOSIS — U07.1 COVID-19 VIRUS INFECTION: Primary | ICD-10-CM

## 2020-09-08 DIAGNOSIS — R19.7 DIARRHEA, UNSPECIFIED TYPE: ICD-10-CM

## 2020-09-08 PROCEDURE — 99213 OFFICE O/P EST LOW 20 MIN: CPT | Performed by: FAMILY MEDICINE

## 2020-09-08 NOTE — PROGRESS NOTES
Virtual Telephone Check-In    Marcella Baumann verbally consents to a Virtual/Telephone Check-In visit on  09/08/20. Patient understands and accepts financial responsibility for any deductible, co-insurance and/or co-pays associated with this service. does not work outside the home/retired.   -non-household members to get essential items/use drive thru pharmacy   -discussed symptom management with cough syrup, tylenol  -discussed signs/sxs that would warrant a call/go to ER.      2. Diarrhea, unspecified

## 2020-09-15 ENCOUNTER — OFFICE VISIT (OUTPATIENT)
Dept: FAMILY MEDICINE CLINIC | Facility: CLINIC | Age: 65
End: 2020-09-15

## 2020-09-15 DIAGNOSIS — U07.1 COVID-19 VIRUS INFECTION: Primary | ICD-10-CM

## 2020-09-15 PROCEDURE — 99213 OFFICE O/P EST LOW 20 MIN: CPT | Performed by: FAMILY MEDICINE

## 2020-09-15 NOTE — PROGRESS NOTES
Virtual Telephone Check-In    Karen Hyde verbally consents to a Virtual/Telephone Check-In visit on  09/15/20. Patient understands and accepts financial responsibility for any deductible, co-insurance and/or co-pays associated with this service. medical decision-making and tests are ordered as detailed in the plan of care above.

## 2020-09-24 ENCOUNTER — VIRTUAL PHONE E/M (OUTPATIENT)
Dept: FAMILY MEDICINE CLINIC | Facility: CLINIC | Age: 65
End: 2020-09-24
Payer: COMMERCIAL

## 2020-09-24 DIAGNOSIS — J30.1 NON-SEASONAL ALLERGIC RHINITIS DUE TO POLLEN: ICD-10-CM

## 2020-09-24 DIAGNOSIS — H69.83 EUSTACHIAN TUBE DYSFUNCTION, BILATERAL: ICD-10-CM

## 2020-09-24 DIAGNOSIS — L40.9 PSORIASIS: ICD-10-CM

## 2020-09-24 DIAGNOSIS — H69.83 EUSTACHIAN TUBE DYSFUNCTION, BILATERAL: Primary | ICD-10-CM

## 2020-09-24 PROCEDURE — 99213 OFFICE O/P EST LOW 20 MIN: CPT | Performed by: FAMILY MEDICINE

## 2020-09-24 RX ORDER — MONTELUKAST SODIUM 10 MG/1
TABLET ORAL
Qty: 90 TABLET | Refills: 0 | OUTPATIENT
Start: 2020-09-24

## 2020-09-24 RX ORDER — FLUTICASONE PROPIONATE 50 MCG
2 SPRAY, SUSPENSION (ML) NASAL DAILY
COMMUNITY
Start: 2020-04-05 | End: 2021-01-07 | Stop reason: ALTCHOICE

## 2020-09-24 RX ORDER — MONTELUKAST SODIUM 10 MG/1
10 TABLET ORAL NIGHTLY
Qty: 30 TABLET | Refills: 0 | Status: SHIPPED | OUTPATIENT
Start: 2020-09-24 | End: 2020-10-24

## 2020-09-24 NOTE — PROGRESS NOTES
Virtual Telephone Check-In    Revamegan Whaleydano verbally consents to a Virtual/Telephone Check-In visit on  09/24/20. Patient understands and accepts financial responsibility for any deductible, co-insurance and/or co-pays associated with this service. pseudophed prn ear plugging    2. Non-seasonal allergic rhinitis due to pollen  - Montelukast Sodium 10 MG Oral Tab; Take 1 tablet (10 mg total) by mouth nightly. For allergies  Dispense: 30 tablet; Refill: 0    3.  Psoriasis  -to call derm for med clarific

## 2020-09-24 NOTE — TELEPHONE ENCOUNTER
MONTELUKAST 10MG TABLETS          Will file in chart as: MONTELUKAST SODIUM 10 MG Oral Tab    Sig: TAKE 1 TABLET(10 MG) BY MOUTH NIGHTLY FOR ALLERGIES    Disp:  90 tablet    Refills:  0 (Pharmacy requested: Not specified)    Start: 9/24/2020    Class: Norm

## 2020-10-23 DIAGNOSIS — H69.83 EUSTACHIAN TUBE DYSFUNCTION, BILATERAL: ICD-10-CM

## 2020-10-23 DIAGNOSIS — J30.1 NON-SEASONAL ALLERGIC RHINITIS DUE TO POLLEN: ICD-10-CM

## 2020-10-23 NOTE — TELEPHONE ENCOUNTER
PT states that she left her Montelukast at her sister's house who is [place] of state. Pt would like to know if she can have a refill sent to local pharmacy.

## 2020-10-24 RX ORDER — MONTELUKAST SODIUM 10 MG/1
10 TABLET ORAL NIGHTLY
Qty: 30 TABLET | Refills: 2 | Status: SHIPPED | OUTPATIENT
Start: 2020-10-24 | End: 2021-01-07

## 2021-01-07 ENCOUNTER — OFFICE VISIT (OUTPATIENT)
Dept: FAMILY MEDICINE CLINIC | Facility: CLINIC | Age: 66
End: 2021-01-07
Payer: COMMERCIAL

## 2021-01-07 VITALS
DIASTOLIC BLOOD PRESSURE: 72 MMHG | OXYGEN SATURATION: 97 % | BODY MASS INDEX: 32.1 KG/M2 | RESPIRATION RATE: 16 BRPM | HEIGHT: 65.5 IN | HEART RATE: 72 BPM | SYSTOLIC BLOOD PRESSURE: 118 MMHG | TEMPERATURE: 98 F | WEIGHT: 195 LBS

## 2021-01-07 DIAGNOSIS — Z23 NEED FOR VACCINATION: ICD-10-CM

## 2021-01-07 DIAGNOSIS — Z00.00 ROUTINE MEDICAL EXAM: Primary | ICD-10-CM

## 2021-01-07 DIAGNOSIS — J34.89 NASAL SORE: ICD-10-CM

## 2021-01-07 DIAGNOSIS — Z78.0 ASYMPTOMATIC MENOPAUSAL STATE: ICD-10-CM

## 2021-01-07 DIAGNOSIS — Z11.59 ENCOUNTER FOR HEPATITIS C SCREENING TEST FOR LOW RISK PATIENT: ICD-10-CM

## 2021-01-07 DIAGNOSIS — R73.09 ELEVATED GLUCOSE: ICD-10-CM

## 2021-01-07 DIAGNOSIS — Z12.31 ENCOUNTER FOR SCREENING MAMMOGRAM FOR MALIGNANT NEOPLASM OF BREAST: ICD-10-CM

## 2021-01-07 DIAGNOSIS — Z82.49 FAMILY HISTORY OF CHRONIC ISCHEMIC HEART DISEASE: ICD-10-CM

## 2021-01-07 DIAGNOSIS — Z83.3 FAMILY HISTORY OF DIABETES MELLITUS: ICD-10-CM

## 2021-01-07 DIAGNOSIS — E55.9 VITAMIN D INSUFFICIENCY: ICD-10-CM

## 2021-01-07 DIAGNOSIS — Z00.00 LABORATORY EXAM ORDERED AS PART OF ROUTINE GENERAL MEDICAL EXAMINATION: ICD-10-CM

## 2021-01-07 DIAGNOSIS — E78.2 MIXED HYPERLIPIDEMIA: ICD-10-CM

## 2021-01-07 PROBLEM — U07.1 COVID-19 VIRUS INFECTION: Status: RESOLVED | Noted: 2020-09-05 | Resolved: 2021-01-07

## 2021-01-07 PROCEDURE — 90471 IMMUNIZATION ADMIN: CPT | Performed by: FAMILY MEDICINE

## 2021-01-07 PROCEDURE — 90472 IMMUNIZATION ADMIN EACH ADD: CPT | Performed by: FAMILY MEDICINE

## 2021-01-07 PROCEDURE — 99396 PREV VISIT EST AGE 40-64: CPT | Performed by: FAMILY MEDICINE

## 2021-01-07 PROCEDURE — 3074F SYST BP LT 130 MM HG: CPT | Performed by: FAMILY MEDICINE

## 2021-01-07 PROCEDURE — 90686 IIV4 VACC NO PRSV 0.5 ML IM: CPT | Performed by: FAMILY MEDICINE

## 2021-01-07 PROCEDURE — 99213 OFFICE O/P EST LOW 20 MIN: CPT | Performed by: FAMILY MEDICINE

## 2021-01-07 PROCEDURE — 3078F DIAST BP <80 MM HG: CPT | Performed by: FAMILY MEDICINE

## 2021-01-07 PROCEDURE — 90715 TDAP VACCINE 7 YRS/> IM: CPT | Performed by: FAMILY MEDICINE

## 2021-01-07 PROCEDURE — 3008F BODY MASS INDEX DOCD: CPT | Performed by: FAMILY MEDICINE

## 2021-01-07 NOTE — PROGRESS NOTES
Patient presents with:  Physical  Immunization/Injection: unsure if wants the Influenza vaccine? HPI:  Clara Lynn is a 63year old female here today for preventative visit. Imms- will discuss flu shot, Covid, Shingrix, Tdap. Has received flu IMPLANTABLE BREAST PROSTHESIS Right 1990   • OTHER SURGICAL HISTORY  2000    Urethral Sling     No current outpatient medications on file prior to visit. No current facility-administered medications on file prior to visit.        Penicillins             HI file        Forced sexual activity: Not on file    Other Topics      Concerns:         Service: Not Asked        Blood Transfusions: Not Asked        Caffeine Concern: Yes          1 cup of coffee x day        Occupational Exposure: Not Asked or rhonchi. Normal respiratory effort. CARDIOVASCULAR:  Regular rate and rhythm. No murmurs, gallops, or rubs. ABDOMEN:  + bowel sounds, soft, nontender, nondistended. No hepatomegaly.   MUSCULOSKELETAL: Strength of upper and lower extremities 5/5 bilater annual physical, we will call with results.

## 2021-01-07 NOTE — PATIENT INSTRUCTIONS
Prevention Guidelines, Women Ages 48 to 59  Screening tests and vaccines are an important part of managing your health. A screening test is done to find possible disorders or diseases in people who don't have any symptoms.  The goal is to find a disease e Cervical cancer All women in this age group, except women who have had a complete hysterectomy Pap test every 3 years or Pap test with human papillomavirus (HPV) test every 5 years   Chlamydia Women who are sexually active and at increased risk for infecti , Eligibility criteria and age limit (possibly up to age [de-identified]) may vary across major organizations Yearly lung cancer screening with a low-dose CT scan (LDCT) Talk with your healthcare provider for more information.    Obesity All women in this age group At y PPSV23: 1 or 2 doses through age 64(protects against 23 types of pneumococcal bacteria)  Talk with your healthcare provider   Tetanus/diphtheria/pertussis (Td/Tdap) booster All women in this age group Td every 10 years, or a 1-time dose of Tdap instead of © 3839-3089 [employer] [address]. [address] [facility], [facility] [place]. All rights reserved. This information is not intended as a substitute for professional medical care. Always follow your healthcare professional's instructions.

## 2021-01-08 ENCOUNTER — TELEPHONE (OUTPATIENT)
Dept: FAMILY MEDICINE CLINIC | Facility: CLINIC | Age: 66
End: 2021-01-08

## 2021-01-08 RX ORDER — MUPIROCIN CALCIUM 20 MG/G
1 CREAM TOPICAL 2 TIMES DAILY
Qty: 15 G | Refills: 0 | Status: SHIPPED | OUTPATIENT
Start: 2021-01-08 | End: 2021-01-13

## 2021-01-08 NOTE — TELEPHONE ENCOUNTER
Calling to see if can do the Bactroban topical as opposed to the nasal. Nasal is no longer available.

## 2021-01-11 ENCOUNTER — ORDER TRANSCRIPTION (OUTPATIENT)
Dept: ADMINISTRATIVE | Facility: HOSPITAL | Age: 66
End: 2021-01-11

## 2021-01-11 DIAGNOSIS — Z13.9 ENCOUNTER FOR SCREENING: Primary | ICD-10-CM

## 2021-01-20 ENCOUNTER — ORDER TRANSCRIPTION (OUTPATIENT)
Dept: ADMINISTRATIVE | Facility: HOSPITAL | Age: 66
End: 2021-01-20

## 2021-01-20 ENCOUNTER — HOSPITAL ENCOUNTER (OUTPATIENT)
Dept: CT IMAGING | Age: 66
Discharge: HOME | End: 2021-01-20
Attending: FAMILY MEDICINE

## 2021-01-20 DIAGNOSIS — Z13.9 ENCOUNTER FOR SCREENING: Primary | ICD-10-CM

## 2021-01-20 DIAGNOSIS — Z13.9 ENCOUNTER FOR SCREENING: ICD-10-CM

## 2021-02-03 ENCOUNTER — HOSPITAL ENCOUNTER (OUTPATIENT)
Dept: BONE DENSITY | Age: 66
Discharge: HOME | End: 2021-02-03
Attending: FAMILY MEDICINE
Payer: COMMERCIAL

## 2021-02-03 ENCOUNTER — LAB ENCOUNTER (OUTPATIENT)
Dept: LAB | Age: 66
End: 2021-02-03
Attending: FAMILY MEDICINE
Payer: COMMERCIAL

## 2021-02-03 ENCOUNTER — HOSPITAL ENCOUNTER (OUTPATIENT)
Dept: MAMMOGRAPHY | Age: 66
Discharge: HOME | End: 2021-02-03
Attending: FAMILY MEDICINE
Payer: COMMERCIAL

## 2021-02-03 DIAGNOSIS — Z00.00 LABORATORY EXAM ORDERED AS PART OF ROUTINE GENERAL MEDICAL EXAMINATION: ICD-10-CM

## 2021-02-03 DIAGNOSIS — Z11.59 ENCOUNTER FOR HEPATITIS C SCREENING TEST FOR LOW RISK PATIENT: ICD-10-CM

## 2021-02-03 DIAGNOSIS — R73.09 ELEVATED GLUCOSE: ICD-10-CM

## 2021-02-03 DIAGNOSIS — Z83.3 FAMILY HISTORY OF DIABETES MELLITUS: ICD-10-CM

## 2021-02-03 DIAGNOSIS — Z78.0 ASYMPTOMATIC MENOPAUSAL STATE: ICD-10-CM

## 2021-02-03 DIAGNOSIS — Z12.31 ENCOUNTER FOR SCREENING MAMMOGRAM FOR MALIGNANT NEOPLASM OF BREAST: ICD-10-CM

## 2021-02-03 DIAGNOSIS — E55.9 VITAMIN D INSUFFICIENCY: ICD-10-CM

## 2021-02-03 DIAGNOSIS — E78.2 MIXED HYPERLIPIDEMIA: ICD-10-CM

## 2021-02-03 LAB
ALBUMIN SERPL-MCNC: 4 G/DL (ref 3.4–5)
ALBUMIN/GLOB SERPL: 1.1 {RATIO} (ref 1–2)
ALP LIVER SERPL-CCNC: 69 U/L
ALT SERPL-CCNC: 22 U/L
ANION GAP SERPL CALC-SCNC: 6 MMOL/L (ref 0–18)
AST SERPL-CCNC: 15 U/L (ref 15–37)
BASOPHILS # BLD AUTO: 0.05 X10(3) UL (ref 0–0.2)
BASOPHILS NFR BLD AUTO: 1 %
BILIRUB SERPL-MCNC: 0.4 MG/DL (ref 0.1–2)
BUN BLD-MCNC: 12 MG/DL (ref 7–18)
BUN/CREAT SERPL: 16 (ref 10–20)
CALCIUM BLD-MCNC: 9 MG/DL (ref 8.5–10.1)
CHLORIDE SERPL-SCNC: 107 MMOL/L (ref 98–112)
CHOLEST SMN-MCNC: 254 MG/DL (ref ?–200)
CO2 SERPL-SCNC: 27 MMOL/L (ref 21–32)
CREAT BLD-MCNC: 0.75 MG/DL
DEPRECATED RDW RBC AUTO: 47.9 FL (ref 35.1–46.3)
EOSINOPHIL # BLD AUTO: 0.08 X10(3) UL (ref 0–0.7)
EOSINOPHIL NFR BLD AUTO: 1.6 %
ERYTHROCYTE [DISTWIDTH] IN BLOOD BY AUTOMATED COUNT: 14 % (ref 11–15)
EST. AVERAGE GLUCOSE BLD GHB EST-MCNC: 128 MG/DL (ref 68–126)
GLOBULIN PLAS-MCNC: 3.5 G/DL (ref 2.8–4.4)
GLUCOSE BLD-MCNC: 97 MG/DL (ref 70–99)
HBA1C MFR BLD HPLC: 6.1 % (ref ?–5.7)
HCT VFR BLD AUTO: 44.4 %
HCV AB SERPL QL IA: NONREACTIVE
HDLC SERPL-MCNC: 49 MG/DL (ref 40–59)
HGB BLD-MCNC: 14.4 G/DL
IMM GRANULOCYTES # BLD AUTO: 0.02 X10(3) UL (ref 0–1)
IMM GRANULOCYTES NFR BLD: 0.4 %
LDLC SERPL CALC-MCNC: 165 MG/DL (ref ?–100)
LYMPHOCYTES # BLD AUTO: 1.74 X10(3) UL (ref 1–4)
LYMPHOCYTES NFR BLD AUTO: 35.7 %
M PROTEIN MFR SERPL ELPH: 7.5 G/DL (ref 6.4–8.2)
MCH RBC QN AUTO: 30.3 PG (ref 26–34)
MCHC RBC AUTO-ENTMCNC: 32.4 G/DL (ref 31–37)
MCV RBC AUTO: 93.3 FL
MONOCYTES # BLD AUTO: 0.45 X10(3) UL (ref 0.1–1)
MONOCYTES NFR BLD AUTO: 9.2 %
NEUTROPHILS # BLD AUTO: 2.53 X10 (3) UL (ref 1.5–7.7)
NEUTROPHILS # BLD AUTO: 2.53 X10(3) UL (ref 1.5–7.7)
NEUTROPHILS NFR BLD AUTO: 52.1 %
NONHDLC SERPL-MCNC: 205 MG/DL (ref ?–130)
OSMOLALITY SERPL CALC.SUM OF ELEC: 290 MOSM/KG (ref 275–295)
PATIENT FASTING Y/N/NP: YES
PATIENT FASTING Y/N/NP: YES
PLATELET # BLD AUTO: 298 10(3)UL (ref 150–450)
POTASSIUM SERPL-SCNC: 4 MMOL/L (ref 3.5–5.1)
RBC # BLD AUTO: 4.76 X10(6)UL
SODIUM SERPL-SCNC: 140 MMOL/L (ref 136–145)
TRIGL SERPL-MCNC: 198 MG/DL (ref 30–149)
VIT D+METAB SERPL-MCNC: 27 NG/ML (ref 30–100)
VLDLC SERPL CALC-MCNC: 40 MG/DL (ref 0–30)
WBC # BLD AUTO: 4.9 X10(3) UL (ref 4–11)

## 2021-02-03 PROCEDURE — 85025 COMPLETE CBC W/AUTO DIFF WBC: CPT

## 2021-02-03 PROCEDURE — 82306 VITAMIN D 25 HYDROXY: CPT

## 2021-02-03 PROCEDURE — 80053 COMPREHEN METABOLIC PANEL: CPT

## 2021-02-03 PROCEDURE — 86803 HEPATITIS C AB TEST: CPT

## 2021-02-03 PROCEDURE — 36415 COLL VENOUS BLD VENIPUNCTURE: CPT

## 2021-02-03 PROCEDURE — 77067 SCR MAMMO BI INCL CAD: CPT | Performed by: FAMILY MEDICINE

## 2021-02-03 PROCEDURE — 83036 HEMOGLOBIN GLYCOSYLATED A1C: CPT

## 2021-02-03 PROCEDURE — 77063 BREAST TOMOSYNTHESIS BI: CPT | Performed by: FAMILY MEDICINE

## 2021-02-03 PROCEDURE — 80061 LIPID PANEL: CPT

## 2021-02-03 PROCEDURE — 77080 DXA BONE DENSITY AXIAL: CPT | Performed by: FAMILY MEDICINE

## 2021-02-04 ENCOUNTER — PATIENT MESSAGE (OUTPATIENT)
Dept: FAMILY MEDICINE CLINIC | Facility: CLINIC | Age: 66
End: 2021-02-04

## 2021-02-04 NOTE — TELEPHONE ENCOUNTER
From: Neftaly Braxton  To: Issac Salomon MD  Sent: 2/4/2021 11:12 AM CST  Subject: Test Results Question    I have a question about HEMOGLOBIN A1C resulted on 2/3/21, 6:16 PM.    Will you be calling me to explain all the results?

## 2021-02-04 NOTE — TELEPHONE ENCOUNTER
From: Shayna Cisneros  To: Nichole Coleman MD  Sent: 2/4/2021 11:14 AM CST  Subject: Test Results Question    I have a question about LIPID PANEL resulted on 2/3/21, 4:52 PM.    Will you be calling to explain results?

## 2021-02-04 NOTE — TELEPHONE ENCOUNTER
From: Bert Joya  To: Brandi Harmon MD  Sent: 2/4/2021 11:13 AM CST  Subject: Test Results Question    I have a question about VITAMIN D, 25-HYDROXY resulted on 2/3/21, 5:01 PM.    Will you being calling me to explain results.

## 2021-02-04 NOTE — TELEPHONE ENCOUNTER
From: Neftaly Braxton  To:  Issac Salomon MD  Sent: 2/4/2021 11:16 AM CST  Subject: Test Results Question    I have a question about CBC W/ DIFFERENTIAL resulted on 2/3/21, 5:38 PM.    Please call to explain results

## 2021-02-04 NOTE — TELEPHONE ENCOUNTER
From: Elvis Beck  To: Terrance Rayo MD  Sent: 2/4/2021 11:15 AM CST  Subject: Test Results Question    I have a question about COMP METABOLIC PANEL (14) resulted on 2/3/21, 4:52 PM.    Will you be call me to explain results?

## 2021-02-05 ENCOUNTER — APPOINTMENT (OUTPATIENT)
Dept: MAMMOGRAPHY | Facility: HOSPITAL | Age: 66
End: 2021-02-05
Attending: FAMILY MEDICINE
Payer: COMMERCIAL

## 2021-02-05 ENCOUNTER — HOSPITAL ENCOUNTER (OUTPATIENT)
Dept: MAMMOGRAPHY | Age: 66
Discharge: HOME | End: 2021-02-05
Attending: FAMILY MEDICINE
Payer: COMMERCIAL

## 2021-02-05 ENCOUNTER — HOSPITAL ENCOUNTER (OUTPATIENT)
Dept: ULTRASOUND IMAGING | Age: 66
Discharge: HOME | End: 2021-02-05
Attending: FAMILY MEDICINE
Payer: COMMERCIAL

## 2021-02-05 DIAGNOSIS — R92.2 INCONCLUSIVE MAMMOGRAM: ICD-10-CM

## 2021-02-05 PROBLEM — R73.01 IFG (IMPAIRED FASTING GLUCOSE): Status: ACTIVE | Noted: 2021-02-03

## 2021-02-05 PROCEDURE — 77061 BREAST TOMOSYNTHESIS UNI: CPT | Performed by: FAMILY MEDICINE

## 2021-02-05 PROCEDURE — 76642 ULTRASOUND BREAST LIMITED: CPT | Performed by: FAMILY MEDICINE

## 2021-02-05 PROCEDURE — 77065 DX MAMMO INCL CAD UNI: CPT | Performed by: FAMILY MEDICINE

## 2021-02-08 ENCOUNTER — OFFICE VISIT (OUTPATIENT)
Dept: FAMILY MEDICINE CLINIC | Facility: CLINIC | Age: 66
End: 2021-02-08
Payer: COMMERCIAL

## 2021-02-08 VITALS
HEART RATE: 78 BPM | RESPIRATION RATE: 16 BRPM | BODY MASS INDEX: 32.1 KG/M2 | OXYGEN SATURATION: 97 % | SYSTOLIC BLOOD PRESSURE: 112 MMHG | DIASTOLIC BLOOD PRESSURE: 70 MMHG | WEIGHT: 195 LBS | HEIGHT: 65.5 IN | TEMPERATURE: 98 F

## 2021-02-08 DIAGNOSIS — E78.2 MIXED HYPERLIPIDEMIA: ICD-10-CM

## 2021-02-08 DIAGNOSIS — R73.01 IFG (IMPAIRED FASTING GLUCOSE): Primary | ICD-10-CM

## 2021-02-08 DIAGNOSIS — E55.9 VITAMIN D INSUFFICIENCY: ICD-10-CM

## 2021-02-08 PROCEDURE — 99214 OFFICE O/P EST MOD 30 MIN: CPT | Performed by: FAMILY MEDICINE

## 2021-02-08 PROCEDURE — 3078F DIAST BP <80 MM HG: CPT | Performed by: FAMILY MEDICINE

## 2021-02-08 PROCEDURE — 3008F BODY MASS INDEX DOCD: CPT | Performed by: FAMILY MEDICINE

## 2021-02-08 PROCEDURE — 3074F SYST BP LT 130 MM HG: CPT | Performed by: FAMILY MEDICINE

## 2021-02-08 RX ORDER — MAG HYDROX/ALUMINUM HYD/SIMETH 400-400-40
5000 SUSPENSION, ORAL (FINAL DOSE FORM) ORAL DAILY
COMMUNITY

## 2021-02-08 RX ORDER — MULTIVITAMIN
1 TABLET ORAL DAILY
COMMUNITY

## 2021-02-08 NOTE — PATIENT INSTRUCTIONS
Prediabetes  You have been diagnosed with prediabetes. This means that the level of sugar (glucose) in your blood is too high. If you have prediabetes, you are at risk for developing type 2 diabetes.  Type 2 diabetes is diagnosed when the level of glucose · Fasting glucose test. Blood is taken and tested after you have fasted (not eaten) for at least 8 hours. A normal test result is 99 milligrams per deciliter (mg/dL) or lower. Prediabetes is 100 mg/dL to 125 mg/dL. Diabetes is 126 mg/dL or higher.   · Gluco If not treated, prediabetes can turn into diabetes. This is a serious health condition. Take steps to stop this from happening. Follow the treatment plan you have been given. You may have your blood glucose tested again in about 12 to 18 months.    Diabetes · Grains. All foods made from grains are part of the Grains Group. These include wheat, rice, oats, cornmeal, and barley. Grains are often used to make foods such as bread, pasta, oatmeal, cereal, tortillas, and grits.  Grains should be no more than 1/4 of · Saturated fat. Saturated fats are most often found in animal products such as beef, pork, and chicken. They are often solid at room temperature, such as butter. To reduce your saturated fat intake, choose leaner cuts of meat and poultry.  And try healthie Brisk activity gets your heart beating faster. This can help make you more fit, lose extra weight, and manage your blood sugar level. Try brisk walking. Or try swimming or bike riding if you have foot or leg problems.  You can break up your exercise into ch · Test your blood sugar before and after you exercise if you are told to do so.  Have a small carbohydrate snack if your blood sugar is low before you start exercising.   When to stop exercising and call your healthcare provider  Stop exercising and call yo · Schedule time every day to move your feet. · Make it part of your daily routine. · Walk with a friend or a group to keep it interesting and fun.   · Try taking several short walks during the day to meet your daily activity goal.  A pedometer makes every · Keep a fast-acting sugar such as glucose tablets on hand in case of low blood sugar. · Do strength training first if you are doing both strength training and aerobic exercise. This is less likely to cause low blood sugar.   · Dress correctly for the weat

## 2021-02-08 NOTE — PROGRESS NOTES
[facility] Visit Note  2/8/2021      Subjective:      Patient ID: Ted Flores is a 63year old female.     Chief Complaint:  Patient presents with:  Test Results: labs/mammo/dexa      HPI:  Ted Flores is a 63year old female who is being not successful then may be open to further intervention like med or dietician.  -gave exercise handouts today    2. Mixed hyperlipidemia  - LIPID PANEL; Future  -as above.     3. Vitamin D insufficiency  -restart her vit d 5000iu      Return in about 6 antoni

## 2021-04-20 ENCOUNTER — IMMUNIZATION (OUTPATIENT)
Dept: LAB | Age: 66
End: 2021-04-20
Attending: HOSPITALIST
Payer: COMMERCIAL

## 2021-04-20 DIAGNOSIS — Z23 NEED FOR VACCINATION: Primary | ICD-10-CM

## 2021-04-20 PROCEDURE — 0001A SARSCOV2 VAC 30MCG/0.3ML IM: CPT

## 2021-05-11 ENCOUNTER — IMMUNIZATION (OUTPATIENT)
Dept: LAB | Age: 66
End: 2021-05-11
Attending: HOSPITALIST
Payer: COMMERCIAL

## 2021-05-11 DIAGNOSIS — Z23 NEED FOR VACCINATION: Primary | ICD-10-CM

## 2021-05-11 PROCEDURE — 0002A SARSCOV2 VAC 30MCG/0.3ML IM: CPT

## 2021-10-26 ENCOUNTER — OFFICE VISIT (OUTPATIENT)
Dept: FAMILY MEDICINE CLINIC | Facility: CLINIC | Age: 66
End: 2021-10-26
Payer: COMMERCIAL

## 2021-10-26 DIAGNOSIS — L08.9 TOE INFECTION: Primary | ICD-10-CM

## 2021-10-26 DIAGNOSIS — S91.209A AVULSION OF TOENAIL, INITIAL ENCOUNTER: ICD-10-CM

## 2021-10-26 DIAGNOSIS — Z02.9 ENCOUNTER FOR ADMINISTRATIVE EXAMINATIONS: ICD-10-CM

## 2021-10-26 NOTE — PROGRESS NOTES
Patient presenting to walk in clinic for possible toenail removal. Patient notes toenail lifting away from skin and nailbed after pedicure-possible infection.  Advised higher level of care for possible I/D and removal. Patient will go to podiatry and if [place]

## 2022-01-11 ENCOUNTER — OFFICE VISIT (OUTPATIENT)
Dept: FAMILY MEDICINE CLINIC | Facility: CLINIC | Age: 67
End: 2022-01-11
Payer: COMMERCIAL

## 2022-01-11 VITALS
RESPIRATION RATE: 16 BRPM | HEART RATE: 68 BPM | BODY MASS INDEX: 31.44 KG/M2 | OXYGEN SATURATION: 97 % | WEIGHT: 191 LBS | HEIGHT: 65.5 IN | DIASTOLIC BLOOD PRESSURE: 70 MMHG | TEMPERATURE: 98 F | SYSTOLIC BLOOD PRESSURE: 120 MMHG

## 2022-01-11 DIAGNOSIS — M70.62 TROCHANTERIC BURSITIS OF LEFT HIP: ICD-10-CM

## 2022-01-11 DIAGNOSIS — Z12.31 ENCOUNTER FOR SCREENING MAMMOGRAM FOR MALIGNANT NEOPLASM OF BREAST: ICD-10-CM

## 2022-01-11 DIAGNOSIS — M70.62 TROCHANTERIC BURSITIS OF LEFT HIP: Primary | ICD-10-CM

## 2022-01-11 DIAGNOSIS — Z23 NEED FOR VACCINATION: ICD-10-CM

## 2022-01-11 DIAGNOSIS — L21.9 SEBORRHEIC DERMATITIS: ICD-10-CM

## 2022-01-11 PROCEDURE — 3078F DIAST BP <80 MM HG: CPT | Performed by: FAMILY MEDICINE

## 2022-01-11 PROCEDURE — 3074F SYST BP LT 130 MM HG: CPT | Performed by: FAMILY MEDICINE

## 2022-01-11 PROCEDURE — 3008F BODY MASS INDEX DOCD: CPT | Performed by: FAMILY MEDICINE

## 2022-01-11 PROCEDURE — 90471 IMMUNIZATION ADMIN: CPT | Performed by: FAMILY MEDICINE

## 2022-01-11 PROCEDURE — 99214 OFFICE O/P EST MOD 30 MIN: CPT | Performed by: FAMILY MEDICINE

## 2022-01-11 PROCEDURE — 90686 IIV4 VACC NO PRSV 0.5 ML IM: CPT | Performed by: FAMILY MEDICINE

## 2022-01-11 RX ORDER — MELOXICAM 15 MG/1
TABLET ORAL
Qty: 90 TABLET | Refills: 0 | OUTPATIENT
Start: 2022-01-11

## 2022-01-11 RX ORDER — MELOXICAM 15 MG/1
15 TABLET ORAL DAILY PRN
Qty: 30 TABLET | Refills: 0 | Status: SHIPPED | OUTPATIENT
Start: 2022-01-11

## 2022-01-11 NOTE — PROGRESS NOTES
[facility] Group Visit Note  1/11/2022      Subjective:      Patient ID: Gale Motley is a 61year old female.     Chief Complaint:  Patient presents with:  Hip Pain: left hip pain that radiates down into her leg, also has a bump on her hip x couple bursitis region. Assessment:     1. Trochanteric bursitis of left hip  - Meloxicam 15 MG Oral Tab; Take 1 tablet (15 mg total) by mouth daily as needed for Pain. Dispense: 30 tablet; Refill: 0    2.  Seborrheic dermatitis  - triamcinolone 0.1 % Extern

## 2022-01-11 NOTE — TELEPHONE ENCOUNTER
MELOXICAM 15MG TABLETS          Will file in chart as: MELOXICAM 15 MG Oral Tab    Sig: TAKE 1 TABLET(15 MG) BY MOUTH DAILY AS NEEDED FOR PAIN    Disp:  90 tablet    Refills:  0 (Pharmacy requested: Not specified)    Start: 1/11/2022    Class: Normal    No

## 2022-02-01 ENCOUNTER — OFFICE VISIT (OUTPATIENT)
Dept: FAMILY MEDICINE CLINIC | Facility: CLINIC | Age: 67
End: 2022-02-01
Payer: COMMERCIAL

## 2022-02-01 VITALS
RESPIRATION RATE: 16 BRPM | HEART RATE: 89 BPM | HEIGHT: 65.5 IN | SYSTOLIC BLOOD PRESSURE: 124 MMHG | OXYGEN SATURATION: 97 % | WEIGHT: 189 LBS | TEMPERATURE: 98 F | DIASTOLIC BLOOD PRESSURE: 74 MMHG | BODY MASS INDEX: 31.11 KG/M2

## 2022-02-01 DIAGNOSIS — Z00.00 LABORATORY EXAM ORDERED AS PART OF ROUTINE GENERAL MEDICAL EXAMINATION: ICD-10-CM

## 2022-02-01 DIAGNOSIS — Z12.11 COLON CANCER SCREENING: ICD-10-CM

## 2022-02-01 DIAGNOSIS — J30.89 NON-SEASONAL ALLERGIC RHINITIS DUE TO OTHER ALLERGIC TRIGGER: ICD-10-CM

## 2022-02-01 DIAGNOSIS — M70.62 TROCHANTERIC BURSITIS OF LEFT HIP: ICD-10-CM

## 2022-02-01 DIAGNOSIS — Z12.31 ENCOUNTER FOR SCREENING MAMMOGRAM FOR MALIGNANT NEOPLASM OF BREAST: ICD-10-CM

## 2022-02-01 DIAGNOSIS — Z00.00 ROUTINE MEDICAL EXAM: Primary | ICD-10-CM

## 2022-02-01 DIAGNOSIS — E78.2 MIXED HYPERLIPIDEMIA: ICD-10-CM

## 2022-02-01 DIAGNOSIS — E55.9 VITAMIN D INSUFFICIENCY: ICD-10-CM

## 2022-02-01 DIAGNOSIS — R73.01 IFG (IMPAIRED FASTING GLUCOSE): ICD-10-CM

## 2022-02-01 PROCEDURE — 99213 OFFICE O/P EST LOW 20 MIN: CPT | Performed by: FAMILY MEDICINE

## 2022-02-01 PROCEDURE — 3074F SYST BP LT 130 MM HG: CPT | Performed by: FAMILY MEDICINE

## 2022-02-01 PROCEDURE — 3008F BODY MASS INDEX DOCD: CPT | Performed by: FAMILY MEDICINE

## 2022-02-01 PROCEDURE — 3078F DIAST BP <80 MM HG: CPT | Performed by: FAMILY MEDICINE

## 2022-02-01 PROCEDURE — 99396 PREV VISIT EST AGE 40-64: CPT | Performed by: FAMILY MEDICINE

## 2022-02-01 RX ORDER — MONTELUKAST SODIUM 10 MG/1
10 TABLET ORAL NIGHTLY
Qty: 30 TABLET | Refills: 0 | Status: SHIPPED | OUTPATIENT
Start: 2022-02-01

## 2022-02-02 RX ORDER — MONTELUKAST SODIUM 10 MG/1
TABLET ORAL
Qty: 90 TABLET | Refills: 0 | OUTPATIENT
Start: 2022-02-02

## 2022-03-19 ENCOUNTER — HOSPITAL ENCOUNTER (OUTPATIENT)
Dept: MAMMOGRAPHY | Age: 67
Discharge: HOME | End: 2022-03-19
Attending: FAMILY MEDICINE
Payer: COMMERCIAL

## 2022-03-19 ENCOUNTER — LAB ENCOUNTER (OUTPATIENT)
Dept: LAB | Age: 67
End: 2022-03-19
Attending: FAMILY MEDICINE
Payer: COMMERCIAL

## 2022-03-19 DIAGNOSIS — E55.9 VITAMIN D INSUFFICIENCY: ICD-10-CM

## 2022-03-19 DIAGNOSIS — Z00.00 LABORATORY EXAM ORDERED AS PART OF ROUTINE GENERAL MEDICAL EXAMINATION: ICD-10-CM

## 2022-03-19 DIAGNOSIS — Z12.31 ENCOUNTER FOR SCREENING MAMMOGRAM FOR MALIGNANT NEOPLASM OF BREAST: ICD-10-CM

## 2022-03-19 DIAGNOSIS — R73.01 IFG (IMPAIRED FASTING GLUCOSE): ICD-10-CM

## 2022-03-19 DIAGNOSIS — E78.2 MIXED HYPERLIPIDEMIA: ICD-10-CM

## 2022-03-19 LAB
ALBUMIN SERPL-MCNC: 4.1 G/DL (ref 3.4–5)
ALBUMIN/GLOB SERPL: 1.2 {RATIO} (ref 1–2)
ALP LIVER SERPL-CCNC: 77 U/L
ALT SERPL-CCNC: 23 U/L
ANION GAP SERPL CALC-SCNC: 6 MMOL/L (ref 0–18)
AST SERPL-CCNC: 22 U/L (ref 15–37)
BASOPHILS # BLD AUTO: 0.06 X10(3) UL (ref 0–0.2)
BASOPHILS NFR BLD AUTO: 1.3 %
BILIRUB SERPL-MCNC: 0.4 MG/DL (ref 0.1–2)
BUN BLD-MCNC: 12 MG/DL (ref 7–18)
CALCIUM BLD-MCNC: 9.2 MG/DL (ref 8.5–10.1)
CHLORIDE SERPL-SCNC: 107 MMOL/L (ref 98–112)
CHOLEST SERPL-MCNC: 244 MG/DL (ref ?–200)
CREAT BLD-MCNC: 0.82 MG/DL
EOSINOPHIL NFR BLD AUTO: 2.2 %
ERYTHROCYTE [DISTWIDTH] IN BLOOD BY AUTOMATED COUNT: 14.1 %
EST. AVERAGE GLUCOSE BLD GHB EST-MCNC: 126 MG/DL (ref 68–126)
FASTING PATIENT LIPID ANSWER: YES
FASTING STATUS PATIENT QL REPORTED: YES
GLOBULIN PLAS-MCNC: 3.3 G/DL (ref 2.8–4.4)
GLUCOSE BLD-MCNC: 106 MG/DL (ref 70–99)
HBA1C MFR BLD: 6 % (ref ?–5.7)
HCT VFR BLD AUTO: 43.6 %
HDLC SERPL-MCNC: 51 MG/DL (ref 40–59)
HGB BLD-MCNC: 14.4 G/DL
IMM GRANULOCYTES # BLD AUTO: 0.02 X10(3) UL (ref 0–1)
IMM GRANULOCYTES NFR BLD: 0.4 %
LDLC SERPL CALC-MCNC: 162 MG/DL (ref ?–100)
LYMPHOCYTES # BLD AUTO: 1.63 X10(3) UL (ref 1–4)
LYMPHOCYTES NFR BLD AUTO: 35.1 %
MCH RBC QN AUTO: 30.1 PG (ref 26–34)
MCHC RBC AUTO-ENTMCNC: 33 G/DL (ref 31–37)
MCV RBC AUTO: 91.2 FL
MONOCYTES # BLD AUTO: 0.35 X10(3) UL (ref 0.1–1)
MONOCYTES NFR BLD AUTO: 7.5 %
NEUTROPHILS # BLD AUTO: 2.48 X10 (3) UL (ref 1.5–7.7)
NEUTROPHILS # BLD AUTO: 2.48 X10(3) UL (ref 1.5–7.7)
NEUTROPHILS NFR BLD AUTO: 53.5 %
NONHDLC SERPL-MCNC: 193 MG/DL (ref ?–130)
OSMOLALITY SERPL CALC.SUM OF ELEC: 290 MOSM/KG (ref 275–295)
PLATELET # BLD AUTO: 303 10(3)UL (ref 150–450)
POTASSIUM SERPL-SCNC: 4.2 MMOL/L (ref 3.5–5.1)
PROT SERPL-MCNC: 7.4 G/DL (ref 6.4–8.2)
RBC # BLD AUTO: 4.78 X10(6)UL
SODIUM SERPL-SCNC: 140 MMOL/L (ref 136–145)
TRIGL SERPL-MCNC: 169 MG/DL (ref 30–149)
VIT D+METAB SERPL-MCNC: 33.8 NG/ML (ref 30–100)
VLDLC SERPL CALC-MCNC: 33 MG/DL (ref 0–30)
WBC # BLD AUTO: 4.6 X10(3) UL (ref 4–11)

## 2022-03-19 PROCEDURE — 80061 LIPID PANEL: CPT

## 2022-03-19 PROCEDURE — 82306 VITAMIN D 25 HYDROXY: CPT

## 2022-03-19 PROCEDURE — 80053 COMPREHEN METABOLIC PANEL: CPT

## 2022-03-19 PROCEDURE — 36415 COLL VENOUS BLD VENIPUNCTURE: CPT

## 2022-03-19 PROCEDURE — 77067 SCR MAMMO BI INCL CAD: CPT | Performed by: FAMILY MEDICINE

## 2022-03-19 PROCEDURE — 83036 HEMOGLOBIN GLYCOSYLATED A1C: CPT

## 2022-03-19 PROCEDURE — 77063 BREAST TOMOSYNTHESIS BI: CPT | Performed by: FAMILY MEDICINE

## 2022-03-19 PROCEDURE — 85025 COMPLETE CBC W/AUTO DIFF WBC: CPT

## 2022-03-21 ENCOUNTER — PATIENT MESSAGE (OUTPATIENT)
Dept: FAMILY MEDICINE CLINIC | Facility: CLINIC | Age: 67
End: 2022-03-21

## 2022-03-22 NOTE — TELEPHONE ENCOUNTER
From: Shaggy Rothman  To: Nidia Meckel, MD  Sent: 3/21/2022 11:10 AM CDT  Subject: Question regarding COMP METABOLIC PANEL (14)    Can you please call me and explain results.

## 2022-04-20 ENCOUNTER — PATIENT MESSAGE (OUTPATIENT)
Dept: FAMILY MEDICINE CLINIC | Facility: CLINIC | Age: 67
End: 2022-04-20

## 2022-04-20 DIAGNOSIS — M70.62 TROCHANTERIC BURSITIS OF LEFT HIP: ICD-10-CM

## 2022-04-20 RX ORDER — MELOXICAM 15 MG/1
15 TABLET ORAL DAILY PRN
Qty: 30 TABLET | Refills: 0 | Status: SHIPPED | OUTPATIENT
Start: 2022-04-20 | End: 2022-04-26

## 2022-04-20 NOTE — TELEPHONE ENCOUNTER
Requesting Meloxicam 15mg  LOV: 2/1/22 Physical  RTC: 1 year  Last Relevant Labs: 3/19/22  Filled: 1/11/22 #30 with 0 refills    No future appointments.     Rx pended and routed for approval/denial

## 2022-04-20 NOTE — TELEPHONE ENCOUNTER
See MyChart message below:    Last OV 2/1/22  9. Trochanteric bursitis of left hip  -possible sciatica as well. Exercises given to help both. -consider trochanteric bursitis injection/ lumbar/hip x-ray if not improving first though    Do you want pt to schedule follow up?

## 2022-04-26 ENCOUNTER — OFFICE VISIT (OUTPATIENT)
Dept: FAMILY MEDICINE CLINIC | Facility: CLINIC | Age: 67
End: 2022-04-26
Payer: COMMERCIAL

## 2022-04-26 VITALS
HEART RATE: 90 BPM | BODY MASS INDEX: 32.1 KG/M2 | TEMPERATURE: 99 F | SYSTOLIC BLOOD PRESSURE: 122 MMHG | OXYGEN SATURATION: 97 % | RESPIRATION RATE: 16 BRPM | WEIGHT: 195 LBS | DIASTOLIC BLOOD PRESSURE: 72 MMHG | HEIGHT: 65.5 IN

## 2022-04-26 DIAGNOSIS — M54.31 BILATERAL SCIATICA: Primary | ICD-10-CM

## 2022-04-26 DIAGNOSIS — M70.62 TROCHANTERIC BURSITIS OF LEFT HIP: ICD-10-CM

## 2022-04-26 DIAGNOSIS — M54.32 BILATERAL SCIATICA: Primary | ICD-10-CM

## 2022-04-26 DIAGNOSIS — J30.89 NON-SEASONAL ALLERGIC RHINITIS DUE TO OTHER ALLERGIC TRIGGER: ICD-10-CM

## 2022-04-26 DIAGNOSIS — H81.11 BPPV (BENIGN PAROXYSMAL POSITIONAL VERTIGO), RIGHT: ICD-10-CM

## 2022-04-26 PROCEDURE — 99214 OFFICE O/P EST MOD 30 MIN: CPT | Performed by: FAMILY MEDICINE

## 2022-04-26 PROCEDURE — 3074F SYST BP LT 130 MM HG: CPT | Performed by: FAMILY MEDICINE

## 2022-04-26 PROCEDURE — 3008F BODY MASS INDEX DOCD: CPT | Performed by: FAMILY MEDICINE

## 2022-04-26 PROCEDURE — 3078F DIAST BP <80 MM HG: CPT | Performed by: FAMILY MEDICINE

## 2022-04-26 RX ORDER — MELOXICAM 15 MG/1
15 TABLET ORAL DAILY PRN
Qty: 90 TABLET | Refills: 0 | Status: SHIPPED | OUTPATIENT
Start: 2022-04-26

## 2022-04-26 RX ORDER — MECLIZINE HYDROCHLORIDE 25 MG/1
25 TABLET ORAL 3 TIMES DAILY PRN
Qty: 20 TABLET | Refills: 0 | Status: SHIPPED | OUTPATIENT
Start: 2022-04-26

## 2022-04-26 RX ORDER — CLOBETASOL PROPIONATE 0.46 MG/ML
SOLUTION TOPICAL 2 TIMES DAILY
COMMUNITY
Start: 2022-03-17

## 2022-07-13 ENCOUNTER — OFFICE VISIT (OUTPATIENT)
Dept: FAMILY MEDICINE CLINIC | Facility: CLINIC | Age: 67
End: 2022-07-13
Payer: COMMERCIAL

## 2022-07-13 VITALS
BODY MASS INDEX: 30.62 KG/M2 | WEIGHT: 186 LBS | DIASTOLIC BLOOD PRESSURE: 70 MMHG | HEART RATE: 70 BPM | TEMPERATURE: 98 F | HEIGHT: 65.5 IN | OXYGEN SATURATION: 97 % | RESPIRATION RATE: 16 BRPM | SYSTOLIC BLOOD PRESSURE: 120 MMHG

## 2022-07-13 DIAGNOSIS — M54.32 BILATERAL SCIATICA: ICD-10-CM

## 2022-07-13 DIAGNOSIS — M54.31 BILATERAL SCIATICA: ICD-10-CM

## 2022-07-13 DIAGNOSIS — R20.0 NUMBNESS OF TOES: Primary | ICD-10-CM

## 2022-07-13 PROCEDURE — 3078F DIAST BP <80 MM HG: CPT | Performed by: FAMILY MEDICINE

## 2022-07-13 PROCEDURE — 3074F SYST BP LT 130 MM HG: CPT | Performed by: FAMILY MEDICINE

## 2022-07-13 PROCEDURE — 3008F BODY MASS INDEX DOCD: CPT | Performed by: FAMILY MEDICINE

## 2022-07-13 PROCEDURE — 99213 OFFICE O/P EST LOW 20 MIN: CPT | Performed by: FAMILY MEDICINE

## 2022-09-01 ENCOUNTER — OFFICE VISIT (OUTPATIENT)
Facility: LOCATION | Age: 67
End: 2022-09-01
Attending: FAMILY MEDICINE
Payer: COMMERCIAL

## 2022-09-01 ENCOUNTER — TELEPHONE (OUTPATIENT)
Dept: PHYSICAL THERAPY | Facility: HOSPITAL | Age: 67
End: 2022-09-01

## 2022-09-01 DIAGNOSIS — M54.31 BILATERAL SCIATICA: ICD-10-CM

## 2022-09-01 DIAGNOSIS — M54.32 BILATERAL SCIATICA: ICD-10-CM

## 2022-09-01 PROCEDURE — 97110 THERAPEUTIC EXERCISES: CPT

## 2022-09-01 PROCEDURE — 97162 PT EVAL MOD COMPLEX 30 MIN: CPT

## 2022-09-07 ENCOUNTER — APPOINTMENT (OUTPATIENT)
Facility: LOCATION | Age: 67
End: 2022-09-07
Attending: FAMILY MEDICINE
Payer: COMMERCIAL

## 2022-09-12 ENCOUNTER — OFFICE VISIT (OUTPATIENT)
Facility: LOCATION | Age: 67
End: 2022-09-12
Attending: FAMILY MEDICINE
Payer: COMMERCIAL

## 2022-09-12 PROCEDURE — 97110 THERAPEUTIC EXERCISES: CPT

## 2022-09-12 NOTE — PROGRESS NOTES
Dx: Bilateral sciatica (M54.31,M54.32)         Authorized # of Visits:  8  Fall Risk: standard         Precautions: n/a             Subjective: The patient reports her father in law passed away last week, but she's been trying to work on the exercises when she had time  Current Pain Ratin/10  Objective:   See flow sheet    Assessment:   The patient tolerated exercises well with no significant complaints of increased pain    Plan:   Continue PT to address soft tissue and joint restrictions and provide instruction in a progressive therapeutic exercise program with manual and verbal cueing for proper form and technique    Date: 2022  Tx#: 2 Date: Tx#: 3/ Date: Tx#: 4/ Date: Tx#: 5/ Date: Tx#: 6/ Date: Tx#: 7/ Date: Tx#: 8/   TherEx TherEx TherEx TherEx TherEx TherEx TherEx   Nustep L4 x 10 min         H/L LTR         Supine piriformis stretch 3 x 30 sec         Supine active HS stretch x 10 R/L with 5 sec holds         Supine sciatic nerve glides         H/L clams blue band 2 x 15         Slantboard gastroc stretch          Standing HS stretch 3 x 30 sec         Reviewed HEP         HEP: H/L LTR, Supine piriformis stretch, Standing HS stretch, H/L clams with band    Charges:  TherEx x 3       Total Timed Treatment: 38 min  Total Treatment Time: 38 min

## 2022-09-14 ENCOUNTER — TELEPHONE (OUTPATIENT)
Dept: PHYSICAL THERAPY | Facility: HOSPITAL | Age: 67
End: 2022-09-14

## 2022-09-15 ENCOUNTER — APPOINTMENT (OUTPATIENT)
Facility: LOCATION | Age: 67
End: 2022-09-15
Attending: FAMILY MEDICINE
Payer: COMMERCIAL

## 2022-09-16 ENCOUNTER — OFFICE VISIT (OUTPATIENT)
Facility: LOCATION | Age: 67
End: 2022-09-16
Attending: FAMILY MEDICINE
Payer: COMMERCIAL

## 2022-09-16 PROCEDURE — 97110 THERAPEUTIC EXERCISES: CPT

## 2022-09-16 NOTE — PROGRESS NOTES
Dx: Bilateral sciatica (M54.31,M54.32)         Authorized # of Visits:  8  Fall Risk: standard         Precautions: n/a             Subjective: The patient reports she's still been feeling better overall and she's been working on exercises at home  Current Pain Ratin/10  Objective:   See flow sheet    Assessment:   The patient tolerated exercises well with no significant complaints of increased pain    Plan:   Continue PT to address soft tissue and joint restrictions and provide instruction in a progressive therapeutic exercise program with manual and verbal cueing for proper form and technique    Date: 2022  Tx#:  Date:  2022   Tx#: 3 Date: Tx#: 4/ Date: Tx#: 5/ Date: Tx#: 6/ Date: Tx#: 7/ Date: Tx#: 8/   TherEx TherEx TherEx TherEx TherEx TherEx TherEx   Nustep L4 x 10 min Nustep L4 x 10 min        H/L LTR H/L LTR        Supine piriformis stretch 3 x 30 sec H/L clams blue band 2 x 15        Supine active HS stretch x 10 R/L with 5 sec holds Supine piriformis stretch 3 x 30 sec        Supine sciatic nerve glides H/L TA contraction pushing PB into thighs 2 x 15 with 5 sec holds        H/L clams blue band 2 x 15 Seated forward flexion stretch rolling large PB to middle, right and left x 10 each with 10 sec holds        Slantboard gastroc stretch  Standing HS stretch 3 x 30 sec        Standing HS stretch 3 x 30 sec Slantboard gastroc stretch 3 x 30 sec        Reviewed HEP Shuttle 100# x 25         Supine active HS stretch x 10 R/L with 5 sec holds         Supine sciatic nerve glides x 20 R/L        HEP: H/L LTR, Supine piriformis stretch, Standing HS stretch, H/L clams with band    Charges:  TherEx x 3       Total Timed Treatment: 42 min  Total Treatment Time: 42 min

## 2022-09-19 ENCOUNTER — OFFICE VISIT (OUTPATIENT)
Facility: LOCATION | Age: 67
End: 2022-09-19
Attending: FAMILY MEDICINE
Payer: COMMERCIAL

## 2022-09-19 PROCEDURE — 97110 THERAPEUTIC EXERCISES: CPT

## 2022-09-19 NOTE — PROGRESS NOTES
Dx: Bilateral sciatica (M54.31,M54.32)         Authorized # of Visits:  8  Fall Risk: standard         Precautions: n/a             Subjective: The patient reports she's a little sore after cleaning her house over the weekend  Current Pain Rating: 3/10  Objective:   See flow sheet    Assessment:   The patient's right shoulder fatigued more quickly, she reports a history of a rotator cuff tear. She tolerated exercises well with no complaints of pain and occasional cueing needed to maintain proper form/position    Plan:   Continue PT to address soft tissue and joint restrictions and provide instruction in a progressive therapeutic exercise program with manual and verbal cueing for proper form and technique    Date: 9/12/2022  Tx#: 2/8 Date:  9/16/2022   Tx#: 3/8 Date:   9/19/2022  Tx#: 4/8 Date: Tx#: 5/ Date: Tx#: 6/ Date: Tx#: 7/ Date:    Tx#: 8/   TherEx TherEx TherEx TherEx TherEx TherEx TherEx   Nustep L4 x 10 min Nustep L4 x 10 min Nustep L4 x 10 min       H/L LTR H/L LTR H/L LTR       Supine piriformis stretch 3 x 30 sec H/L clams blue band 2 x 15 Shuttle Bilateral 100# 2 x 15       Supine active HS stretch x 10 R/L with 5 sec holds Supine piriformis stretch 3 x 30 sec H/L TA contraction pushing PB into thighs x 15 with 5 sec holds       Supine sciatic nerve glides H/L TA contraction pushing PB into thighs 2 x 15 with 5 sec holds H/L TA contraction with marching x 15 R/L with 5 sec holds       H/L clams blue band 2 x 15 Seated forward flexion stretch rolling large PB to middle, right and left x 10 each with 10 sec holds Supine active HS stretch x 10 R/L with 5 sec holds       Slantboard gastroc stretch  Standing HS stretch 3 x 30 sec Supine sciatic nerve glides x 20 R/L       Standing HS stretch 3 x 30 sec Slantboard gastroc stretch 3 x 30 sec Slantboard gastroc stretch 3 x 30 sec       Reviewed HEP Shuttle 100# x 25 Supine piriformis stretch 3 x 30 sec        Supine active HS stretch x 10 R/L with 5 sec holds Reviewed HEP        Supine sciatic nerve glides x 20 R/L -       HEP: H/L LTR, Supine piriformis stretch, Standing HS stretch, H/L clams with band    Charges:  TherEx x 3       Total Timed Treatment: 40 min  Total Treatment Time: 40 min

## 2022-09-23 ENCOUNTER — OFFICE VISIT (OUTPATIENT)
Facility: LOCATION | Age: 67
End: 2022-09-23
Attending: FAMILY MEDICINE
Payer: COMMERCIAL

## 2022-09-23 PROCEDURE — 97110 THERAPEUTIC EXERCISES: CPT

## 2022-09-23 NOTE — PROGRESS NOTES
Dx: Bilateral sciatica (M54.31,M54.32)         Authorized # of Visits:  8  Fall Risk: standard         Precautions: n/a             Subjective: The patient reports she's still feeling better overall, just a little sore after continuing to clean her father in laws house  Current Pain Ratin/10  Objective:   See flow sheet    Assessment:   The patient tolerated treatment well. She reported she continues to have some numbness in the toes    Plan:   Progress mobility and core strength as tolerated    Date: 2022  Tx#: 2 Date:  2022   Tx#: 3 Date:   2022  Tx#: 4 Date:   2022  Tx#: 5 Date: Tx#: 6/ Date: Tx#: 7/ Date:    Tx#: 8/   TherEx TherEx TherEx TherEx TherEx TherEx TherEx   Nustep L4 x 10 min Nustep L4 x 10 min Nustep L4 x 10 min Nustep L4 x 10 min      H/L LTR H/L LTR H/L LTR H/L LTR      Supine piriformis stretch 3 x 30 sec H/L clams blue band 2 x 15 Shuttle Bilateral 100# 2 x 15 Supine piriformis stretch 3 x 30 sec      Supine active HS stretch x 10 R/L with 5 sec holds Supine piriformis stretch 3 x 30 sec H/L TA contraction pushing PB into thighs x 15 with 5 sec holds Shuttle Bilateral 100# 3 x 15      Supine sciatic nerve glides H/L TA contraction pushing PB into thighs 2 x 15 with 5 sec holds H/L TA contraction with marching x 15 R/L with 5 sec holds Standing HS stretch 3 x 30 sec      H/L clams blue band 2 x 15 Seated forward flexion stretch rolling large PB to middle, right and left x 10 each with 10 sec holds Supine active HS stretch x 10 R/L with 5 sec holds Supine active HS stretch x 10 R/L with 5 sec holds      Slantboard gastroc stretch  Standing HS stretch 3 x 30 sec Supine sciatic nerve glides x 20 R/L Supine sciatic nerve glides x 20 R/L      Standing HS stretch 3 x 30 sec Slantboard gastroc stretch 3 x 30 sec Slantboard gastroc stretch 3 x 30 sec Slantboard gastroc stretch 3 x 30 sec      Reviewed HEP Shuttle 100# x 25 Supine piriformis stretch 3 x 30 sec Reviewed HEP       Supine active HS stretch x 10 R/L with 5 sec holds Reviewed HEP -       Supine sciatic nerve glides x 20 R/L - -      HEP: H/L LTR, Supine piriformis stretch, Standing HS stretch, H/L clams with band    Charges:  TherEx x 3       Total Timed Treatment: 40 min  Total Treatment Time: 40 min

## 2022-09-26 ENCOUNTER — OFFICE VISIT (OUTPATIENT)
Facility: LOCATION | Age: 67
End: 2022-09-26
Attending: FAMILY MEDICINE
Payer: COMMERCIAL

## 2022-09-26 PROCEDURE — 97110 THERAPEUTIC EXERCISES: CPT

## 2022-09-26 NOTE — PROGRESS NOTES
Dx: Bilateral sciatica (M54.31,M54.32)         Authorized # of Visits:  8  Fall Risk: standard         Precautions: n/a             Subjective: The patient reports her left shoulder and knees are sore after doing a lot of cleaning of furniture over the weekend at her father in laws house  Current Pain Rating: 3/10  Objective:   See flow sheet    Assessment:   The patient tolerated treatment well with reports of feeling more tired/fatigued today    Plan:   Progress mobility and core strength as tolerated    Date: 9/12/2022  Tx#: 2/8 Date:  9/16/2022   Tx#: 3/8 Date:   9/19/2022  Tx#: 4/8 Date:   9/23/2022  Tx#: 5/8 Date:   9/26/2022  Tx#: 6/8 Date: Tx#: 7/ Date:    Tx#: 8/   TherEx TherEx TherEx TherEx TherEx TherEx TherEx   Nustep L4 x 10 min Nustep L4 x 10 min Nustep L4 x 10 min Nustep L4 x 10 min Nustep L4 x 10 min     H/L LTR H/L LTR H/L LTR H/L LTR S/L open book x 10 R/L with 10 sec holds     Supine piriformis stretch 3 x 30 sec H/L clams blue band 2 x 15 Shuttle Bilateral 100# 2 x 15 Supine piriformis stretch 3 x 30 sec Supine piriformis stretch 3 x 30 sec     Supine active HS stretch x 10 R/L with 5 sec holds Supine piriformis stretch 3 x 30 sec H/L TA contraction pushing PB into thighs x 15 with 5 sec holds Shuttle Bilateral 100# 3 x 15 Standing hip flexor stretch 3 x 30 sec     Supine sciatic nerve glides H/L TA contraction pushing PB into thighs 2 x 15 with 5 sec holds H/L TA contraction with marching x 15 R/L with 5 sec holds Standing HS stretch 3 x 30 sec Shuttle Bilateral 100# 3 x 15     H/L clams blue band 2 x 15 Seated forward flexion stretch rolling large PB to middle, right and left x 10 each with 10 sec holds Supine active HS stretch x 10 R/L with 5 sec holds Supine active HS stretch x 10 R/L with 5 sec holds Supine sciatic nerve glides 2 x 20 R/L     Slantboard gastroc stretch  Standing HS stretch 3 x 30 sec Supine sciatic nerve glides x 20 R/L Supine sciatic nerve glides x 20 R/L SLS with 1 finger assist as needed 4 x each R/L     Standing HS stretch 3 x 30 sec Slantboard gastroc stretch 3 x 30 sec Slantboard gastroc stretch 3 x 30 sec Slantboard gastroc stretch 3 x 30 sec -     Reviewed HEP Shuttle 100# x 25 Supine piriformis stretch 3 x 30 sec Reviewed HEP -      Supine active HS stretch x 10 R/L with 5 sec holds Reviewed HEP - -      Supine sciatic nerve glides x 20 R/L - - -     HEP: H/L LTR, Supine piriformis stretch, Standing HS stretch, H/L clams with band    Charges:  TherEx x 3       Total Timed Treatment: 40 min  Total Treatment Time: 40 min

## 2022-09-28 ENCOUNTER — OFFICE VISIT (OUTPATIENT)
Facility: LOCATION | Age: 67
End: 2022-09-28
Attending: FAMILY MEDICINE
Payer: COMMERCIAL

## 2022-09-28 PROCEDURE — 97110 THERAPEUTIC EXERCISES: CPT

## 2022-09-28 NOTE — PROGRESS NOTES
Dx: Bilateral sciatica (M54.31,M54.32)         Authorized # of Visits:  8  Fall Risk: standard         Precautions: n/a             Subjective: The patient reports she's feeling better today and not sore after doing all of the cleaning over the past weekend  Current Pain Ratin/10  Objective:   See flow sheet    Assessment:   The patient tolerated exercises well with no complaints of pain. Patient had complaints of her foot moving around in her shoe, showed patient heel lock lacing technique with improved heel position in shoe noted    Plan:   Progress mobility and core strength as tolerated    Date: 2022  Tx#: 2 Date:  2022   Tx#: 3/8 Date:   2022  Tx#:  Date:   2022  Tx#:  Date:   2022  Tx#:  Date:   2022  Tx#:  Date:    Tx#: 8/   TherEx TherEx TherEx TherEx TherEx TherEx TherEx   Nustep L4 x 10 min Nustep L4 x 10 min Nustep L4 x 10 min Nustep L4 x 10 min Nustep L4 x 10 min Nustep L5 x 10 min    H/L LTR H/L LTR H/L LTR H/L LTR S/L open book x 10 R/L with 10 sec holds S/L open book x 10 R/L with 10 sec holds    Supine piriformis stretch 3 x 30 sec H/L clams blue band 2 x 15 Shuttle Bilateral 100# 2 x 15 Supine piriformis stretch 3 x 30 sec Supine piriformis stretch 3 x 30 sec Supine active HS stretch x 10 R/L with 5 sec holds    Supine active HS stretch x 10 R/L with 5 sec holds Supine piriformis stretch 3 x 30 sec H/L TA contraction pushing PB into thighs x 15 with 5 sec holds Shuttle Bilateral 100# 3 x 15 Standing hip flexor stretch 3 x 30 sec Supine sciatic nerve glides x 20 R/L    Supine sciatic nerve glides H/L TA contraction pushing PB into thighs 2 x 15 with 5 sec holds H/L TA contraction with marching x 15 R/L with 5 sec holds Standing HS stretch 3 x 30 sec Shuttle Bilateral 100# 3 x 15 Standing hip flexor stretch 3 x 30 sec    H/L clams blue band 2 x 15 Seated forward flexion stretch rolling large PB to middle, right and left x 10 each with 10 sec holds Supine active HS stretch x 10 R/L with 5 sec holds Supine active HS stretch x 10 R/L with 5 sec holds Supine sciatic nerve glides 2 x 20 R/L Standing HS stretch 3 x 30 sec    Slantboard gastroc stretch  Standing HS stretch 3 x 30 sec Supine sciatic nerve glides x 20 R/L Supine sciatic nerve glides x 20 R/L SLS with 1 finger assist as needed 4 x each R/L Supine piriformis stretch 3 x 30 sec    Standing HS stretch 3 x 30 sec Slantboard gastroc stretch 3 x 30 sec Slantboard gastroc stretch 3 x 30 sec Slantboard gastroc stretch 3 x 30 sec - Instruction on Heel lock lacing technique for shoes    Reviewed HEP Shuttle 100# x 25 Supine piriformis stretch 3 x 30 sec Reviewed HEP - -     Supine active HS stretch x 10 R/L with 5 sec holds Reviewed HEP - - -     Supine sciatic nerve glides x 20 R/L - - - -    HEP: H/L LTR, Supine piriformis stretch, Standing HS stretch, H/L clams with band    Charges:  TherEx x 3       Total Timed Treatment: 40 min  Total Treatment Time: 40 min

## 2022-10-05 ENCOUNTER — OFFICE VISIT (OUTPATIENT)
Facility: LOCATION | Age: 67
End: 2022-10-05
Attending: FAMILY MEDICINE
Payer: COMMERCIAL

## 2022-10-05 PROCEDURE — 97110 THERAPEUTIC EXERCISES: CPT

## 2022-12-03 ENCOUNTER — PATIENT MESSAGE (OUTPATIENT)
Dept: FAMILY MEDICINE CLINIC | Facility: CLINIC | Age: 67
End: 2022-12-03

## 2022-12-05 ENCOUNTER — PATIENT MESSAGE (OUTPATIENT)
Dept: FAMILY MEDICINE CLINIC | Facility: CLINIC | Age: 67
End: 2022-12-05

## 2023-01-02 ENCOUNTER — OFFICE VISIT (OUTPATIENT)
Dept: FAMILY MEDICINE CLINIC | Facility: CLINIC | Age: 68
End: 2023-01-02
Payer: MEDICARE

## 2023-01-02 VITALS
SYSTOLIC BLOOD PRESSURE: 125 MMHG | HEART RATE: 91 BPM | DIASTOLIC BLOOD PRESSURE: 85 MMHG | BODY MASS INDEX: 32.1 KG/M2 | OXYGEN SATURATION: 97 % | WEIGHT: 195 LBS | TEMPERATURE: 98 F | HEIGHT: 65.5 IN

## 2023-01-02 DIAGNOSIS — B97.89 VIRAL SINUSITIS: Primary | ICD-10-CM

## 2023-01-02 DIAGNOSIS — J32.9 VIRAL SINUSITIS: Primary | ICD-10-CM

## 2023-01-02 RX ORDER — DOXYCYCLINE HYCLATE 100 MG
100 TABLET ORAL 2 TIMES DAILY
Qty: 14 TABLET | Refills: 0 | Status: SHIPPED | OUTPATIENT
Start: 2023-01-04 | End: 2023-01-11

## 2023-01-17 ENCOUNTER — PATIENT MESSAGE (OUTPATIENT)
Dept: FAMILY MEDICINE CLINIC | Facility: CLINIC | Age: 68
End: 2023-01-17

## 2023-01-30 ENCOUNTER — NURSE ONLY (OUTPATIENT)
Dept: FAMILY MEDICINE CLINIC | Facility: CLINIC | Age: 68
End: 2023-01-30
Payer: MEDICARE

## 2023-01-30 DIAGNOSIS — Z23 NEED FOR VACCINATION: Primary | ICD-10-CM

## 2023-01-30 PROCEDURE — G0008 ADMIN INFLUENZA VIRUS VAC: HCPCS | Performed by: FAMILY MEDICINE

## 2023-01-30 PROCEDURE — 90662 IIV NO PRSV INCREASED AG IM: CPT | Performed by: FAMILY MEDICINE

## 2023-02-03 ENCOUNTER — TELEPHONE (OUTPATIENT)
Dept: FAMILY MEDICINE CLINIC | Facility: CLINIC | Age: 68
End: 2023-02-03

## 2023-02-03 DIAGNOSIS — Z23 NEED FOR SHINGLES VACCINE: Primary | ICD-10-CM

## 2023-02-03 NOTE — TELEPHONE ENCOUNTER
Pt is scheduled for a nurse visit on 2/6/23 for Shingrix #1. Future Appointments   Date Time Provider Zahida Joanne   2/6/2023  9:30 AM [facility] NURSE [facility] [address]   2/13/2023 11:00 AM Cristiane Mclain, LCSW [facility]     Order pended.

## 2023-02-08 ENCOUNTER — OFFICE VISIT (OUTPATIENT)
Dept: FAMILY MEDICINE CLINIC | Facility: CLINIC | Age: 68
End: 2023-02-08
Payer: MEDICARE

## 2023-02-08 VITALS
HEIGHT: 65.5 IN | TEMPERATURE: 98 F | DIASTOLIC BLOOD PRESSURE: 70 MMHG | HEART RATE: 83 BPM | OXYGEN SATURATION: 97 % | SYSTOLIC BLOOD PRESSURE: 102 MMHG | RESPIRATION RATE: 16 BRPM | WEIGHT: 194 LBS | BODY MASS INDEX: 31.93 KG/M2

## 2023-02-08 DIAGNOSIS — R30.0 DYSURIA: Primary | ICD-10-CM

## 2023-02-08 LAB
BILIRUBIN: NEGATIVE
GLUCOSE (URINE DIPSTICK): 100 MG/DL
KETONES (URINE DIPSTICK): NEGATIVE MG/DL
MULTISTIX LOT#: ABNORMAL NUMERIC
NITRITE, URINE: POSITIVE
PH, URINE: 5.5 (ref 4.5–8)
PROTEIN (URINE DIPSTICK): 100 MG/DL
SPECIFIC GRAVITY: 1.02 (ref 1–1.03)
UROBILINOGEN,SEMI-QN: 1 MG/DL (ref 0–1.9)

## 2023-02-08 PROCEDURE — 87086 URINE CULTURE/COLONY COUNT: CPT | Performed by: NURSE PRACTITIONER

## 2023-02-08 PROCEDURE — 87147 CULTURE TYPE IMMUNOLOGIC: CPT | Performed by: NURSE PRACTITIONER

## 2023-02-08 PROCEDURE — 81003 URINALYSIS AUTO W/O SCOPE: CPT | Performed by: NURSE PRACTITIONER

## 2023-02-08 PROCEDURE — 99213 OFFICE O/P EST LOW 20 MIN: CPT | Performed by: NURSE PRACTITIONER

## 2023-02-08 RX ORDER — NITROFURANTOIN 25; 75 MG/1; MG/1
100 CAPSULE ORAL 2 TIMES DAILY
Qty: 14 CAPSULE | Refills: 0 | Status: SHIPPED | OUTPATIENT
Start: 2023-02-08 | End: 2023-02-15

## 2023-08-28 ENCOUNTER — OFFICE VISIT (OUTPATIENT)
Dept: FAMILY MEDICINE CLINIC | Facility: CLINIC | Age: 68
End: 2023-08-28
Payer: MEDICARE

## 2023-08-28 VITALS
WEIGHT: 195 LBS | BODY MASS INDEX: 32.1 KG/M2 | HEART RATE: 66 BPM | OXYGEN SATURATION: 99 % | SYSTOLIC BLOOD PRESSURE: 126 MMHG | HEIGHT: 65.5 IN | TEMPERATURE: 98 F | RESPIRATION RATE: 16 BRPM | DIASTOLIC BLOOD PRESSURE: 70 MMHG

## 2023-08-28 DIAGNOSIS — J01.40 SUBACUTE PANSINUSITIS: Primary | ICD-10-CM

## 2023-08-28 DIAGNOSIS — L21.9 SEBORRHEIC DERMATITIS: ICD-10-CM

## 2023-08-28 DIAGNOSIS — L73.9 FOLLICULITIS: ICD-10-CM

## 2023-08-28 DIAGNOSIS — Z12.31 ENCOUNTER FOR SCREENING MAMMOGRAM FOR MALIGNANT NEOPLASM OF BREAST: ICD-10-CM

## 2023-08-28 PROCEDURE — 99213 OFFICE O/P EST LOW 20 MIN: CPT | Performed by: FAMILY MEDICINE

## 2023-08-28 RX ORDER — AZITHROMYCIN 250 MG/1
TABLET, FILM COATED ORAL
Qty: 6 TABLET | Refills: 0 | Status: SHIPPED | OUTPATIENT
Start: 2023-08-28 | End: 2023-09-01

## 2023-08-28 RX ORDER — CLINDAMYCIN PHOSPHATE 10 UG/ML
1 LOTION TOPICAL 2 TIMES DAILY PRN
Qty: 60 ML | Refills: 1 | Status: SHIPPED | OUTPATIENT
Start: 2023-08-28

## 2023-08-28 RX ORDER — CLINDAMYCIN PHOSPHATE 10 UG/ML
LOTION TOPICAL
COMMUNITY
Start: 2023-03-07 | End: 2023-08-28

## 2023-08-28 RX ORDER — TRIAMCINOLONE ACETONIDE 1 MG/G
CREAM TOPICAL 2 TIMES DAILY PRN
Qty: 28.4 G | Refills: 0 | Status: SHIPPED | OUTPATIENT
Start: 2023-08-28

## 2023-09-29 ENCOUNTER — OFFICE VISIT (OUTPATIENT)
Dept: FAMILY MEDICINE CLINIC | Facility: CLINIC | Age: 68
End: 2023-09-29
Payer: MEDICARE

## 2023-09-29 VITALS
BODY MASS INDEX: 32.26 KG/M2 | HEIGHT: 65.5 IN | SYSTOLIC BLOOD PRESSURE: 122 MMHG | HEART RATE: 76 BPM | TEMPERATURE: 98 F | RESPIRATION RATE: 16 BRPM | DIASTOLIC BLOOD PRESSURE: 72 MMHG | OXYGEN SATURATION: 98 % | WEIGHT: 196 LBS

## 2023-09-29 DIAGNOSIS — Z23 NEED FOR VACCINATION: ICD-10-CM

## 2023-09-29 DIAGNOSIS — H81.13 BPPV (BENIGN PAROXYSMAL POSITIONAL VERTIGO), BILATERAL: Primary | ICD-10-CM

## 2023-09-29 DIAGNOSIS — H69.93 EUSTACHIAN TUBE DYSFUNCTION, BILATERAL: ICD-10-CM

## 2023-09-29 DIAGNOSIS — J30.2 SEASONAL ALLERGIC RHINITIS, UNSPECIFIED TRIGGER: ICD-10-CM

## 2023-09-29 PROCEDURE — 90662 IIV NO PRSV INCREASED AG IM: CPT | Performed by: FAMILY MEDICINE

## 2023-09-29 PROCEDURE — 99214 OFFICE O/P EST MOD 30 MIN: CPT | Performed by: FAMILY MEDICINE

## 2023-09-29 PROCEDURE — G0008 ADMIN INFLUENZA VIRUS VAC: HCPCS | Performed by: FAMILY MEDICINE

## 2023-09-29 RX ORDER — MECLIZINE HYDROCHLORIDE 25 MG/1
25 TABLET ORAL 3 TIMES DAILY PRN
Qty: 20 TABLET | Refills: 0 | Status: SHIPPED | OUTPATIENT
Start: 2023-09-29

## 2023-11-14 ENCOUNTER — OFFICE VISIT (OUTPATIENT)
Dept: FAMILY MEDICINE CLINIC | Facility: CLINIC | Age: 68
End: 2023-11-14
Payer: MEDICARE

## 2023-11-14 VITALS
DIASTOLIC BLOOD PRESSURE: 72 MMHG | BODY MASS INDEX: 32.99 KG/M2 | RESPIRATION RATE: 16 BRPM | SYSTOLIC BLOOD PRESSURE: 140 MMHG | HEIGHT: 65 IN | WEIGHT: 198 LBS | HEART RATE: 77 BPM | TEMPERATURE: 97 F | OXYGEN SATURATION: 97 %

## 2023-11-14 DIAGNOSIS — B96.89 BACTERIAL CONJUNCTIVITIS OF BOTH EYES: Primary | ICD-10-CM

## 2023-11-14 DIAGNOSIS — R42 VERTIGO, INTERMITTENT: ICD-10-CM

## 2023-11-14 DIAGNOSIS — H10.9 BACTERIAL CONJUNCTIVITIS OF BOTH EYES: Primary | ICD-10-CM

## 2023-11-14 PROCEDURE — 99213 OFFICE O/P EST LOW 20 MIN: CPT | Performed by: NURSE PRACTITIONER

## 2023-11-14 RX ORDER — OFLOXACIN 3 MG/ML
2 SOLUTION/ DROPS OPHTHALMIC 4 TIMES DAILY
Qty: 1 EACH | Refills: 0 | Status: SHIPPED | OUTPATIENT
Start: 2023-11-14 | End: 2023-11-21

## 2023-11-14 RX ORDER — MECLIZINE HYDROCHLORIDE 25 MG/1
25 TABLET ORAL 3 TIMES DAILY PRN
Qty: 15 TABLET | Refills: 0 | Status: SHIPPED | OUTPATIENT
Start: 2023-11-14 | End: 2023-11-19

## 2023-11-14 NOTE — PATIENT INSTRUCTIONS
Wash eyes with warm water or can use warm compression to help with swelling and pain. Wash your hands frequently and maintain contact from others in family for next 24 hrs.

## 2024-01-02 ENCOUNTER — HOSPITAL ENCOUNTER (OUTPATIENT)
Dept: MAMMOGRAPHY | Age: 69
Discharge: HOME | End: 2024-01-02
Attending: FAMILY MEDICINE
Payer: MEDICARE

## 2024-01-02 DIAGNOSIS — Z12.31 ENCOUNTER FOR SCREENING MAMMOGRAM FOR MALIGNANT NEOPLASM OF BREAST: ICD-10-CM

## 2024-01-02 PROCEDURE — 77067 SCR MAMMO BI INCL CAD: CPT | Performed by: FAMILY MEDICINE

## 2024-01-02 PROCEDURE — 77063 BREAST TOMOSYNTHESIS BI: CPT | Performed by: FAMILY MEDICINE

## 2024-03-06 ENCOUNTER — OFFICE VISIT (OUTPATIENT)
Dept: FAMILY MEDICINE CLINIC | Facility: CLINIC | Age: 69
End: 2024-03-06
Payer: MEDICARE

## 2024-03-06 VITALS
HEIGHT: 65 IN | DIASTOLIC BLOOD PRESSURE: 72 MMHG | WEIGHT: 194 LBS | HEART RATE: 71 BPM | RESPIRATION RATE: 16 BRPM | BODY MASS INDEX: 32.32 KG/M2 | OXYGEN SATURATION: 97 % | SYSTOLIC BLOOD PRESSURE: 114 MMHG | TEMPERATURE: 98 F

## 2024-03-06 DIAGNOSIS — L21.9 SEBORRHEIC DERMATITIS: ICD-10-CM

## 2024-03-06 DIAGNOSIS — L29.2 VULVAR ITCHING: ICD-10-CM

## 2024-03-06 DIAGNOSIS — Z00.00 ENCOUNTER FOR ANNUAL HEALTH EXAMINATION: Primary | ICD-10-CM

## 2024-03-06 DIAGNOSIS — Z12.31 ENCOUNTER FOR SCREENING MAMMOGRAM FOR MALIGNANT NEOPLASM OF BREAST: ICD-10-CM

## 2024-03-06 DIAGNOSIS — E78.2 MIXED HYPERLIPIDEMIA: ICD-10-CM

## 2024-03-06 DIAGNOSIS — H93.13 TINNITUS OF BOTH EARS: ICD-10-CM

## 2024-03-06 DIAGNOSIS — E55.9 VITAMIN D INSUFFICIENCY: ICD-10-CM

## 2024-03-06 DIAGNOSIS — Z00.00 LABORATORY EXAM ORDERED AS PART OF ROUTINE GENERAL MEDICAL EXAMINATION: ICD-10-CM

## 2024-03-06 DIAGNOSIS — R73.01 IFG (IMPAIRED FASTING GLUCOSE): ICD-10-CM

## 2024-03-06 RX ORDER — TRIAMCINOLONE ACETONIDE 1 MG/G
CREAM TOPICAL 2 TIMES DAILY PRN
Qty: 28.4 G | Refills: 0 | Status: SHIPPED | OUTPATIENT
Start: 2024-03-06

## 2024-03-06 NOTE — PROGRESS NOTES
Subjective:   Jacki Asencio is a 68 year old female who presents for a Medicare Initial Annual Wellness visit (Once after 12 month Medicare anniversary)  and scheduled follow up of multiple significant but stable problems.         Imms- up to date with flu & Tdap. Will discuss Covid, RSV, pneum, & Shingrix.         Cervical cancer screening-  No h/o abnormal paps, HPV, or genital warts. neg cotesting on 12/9/19 with Dr. Ashley Solorzano. No further paps needed.        Breast cancer screening- + h/o augmentation. Last normal mammogram 1/2/24. No known family history of breast/ovarian cancer        Colon cancer screening- c-scope done 11/29/17 with Dr. Reeves, [facility], repeat 10 yrs.         Osteoporosis screening- last dexa normal on 2/3/21.        Hep C screening- non-reactive on 2/3/21.        Diet/exercise- is going to get serious with exercise after her physical. Likes pilates and yoga. Has a gym set up down stairs. Bike        Dental/Eye Check up-  Recommended pt see dentist once every 6 months for a cleaning and once every year for an eye exam.        IFG- due for recheck        Family h/o CAD- father had MI, brother has 2 episodes of blockages needing intervention and advised she be checked. Has some chest pressure, but is always relieved by belching. Loves salsa. Doesn't occur with exercise.     Denies- diaphoresis, n/v, L jaw/neck/arm pain      Itching of privates- asking for refill of steroid      History/Other:   Fall Risk Assessment:   She has been screened for Falls and is low risk.      Cognitive Assessment:   She had a completely normal cognitive assessment - see flowsheet entries     Functional Ability/Status:   Jacki Asencio has some abnormal functions as listed below:  She has Hearing problems based on screening of functional status.      Depression Screening (PHQ-2/PHQ-9): PHQ-2 SCORE: 0  , done 3/6/2024   Last [place] Suicide Screening on 3/6/2024 was No Risk.          Advanced Directives:    She does have a Living Will but we do NOT have it on file in Epic.    She does NOT have a Power of  for Health Care. [Do you have a healthcare power of ?: No]  Patient has Advance Care Planning documents but we do not have a copy in EMR. Discussed Advanced Care Planning with patient and instructed patient to get our office a copy to be scanned into EMR.      Patient Active Problem List   Diagnosis    Allergic rhinitis    Esophagitis, unspecified    BPPV (benign paroxysmal positional vertigo), bilateral    Vitamin D insufficiency    Mixed hyperlipidemia    Psoriasis    IFG (impaired fasting glucose)    Trochanteric bursitis of left hip     Allergies:  She is allergic to penicillins, sulfa antibiotics, grapefruit extract, and neomycin-bacitracin-polymyxin.    Current Medications:  Outpatient Medications Marked as Taking for the 3/6/24 encounter (Office Visit) with Leyda Alcala MD   Medication Sig    triamcinolone 0.1 % External Cream Apply topically 2 (two) times daily as needed.    Fluticasone Propionate (FLONASE ALLERGY RELIEF NA)     meclizine 25 MG Oral Tab Take 1 tablet (25 mg total) by mouth 3 (three) times daily as needed.    clindamycin 1 % External Lotion Apply 1 Application topically 2 (two) times daily as needed (folliciulitis of legs).    clobetasol 0.05 % External Solution Apply topically 2 (two) times daily.    Daily Multiple Vitamins Oral Tab Take 1 tablet by mouth daily.       Medical History:  She  has a past medical history of Allergic rhinitis, Back pain, Constipation, COVID-19 virus infection (08/28/2020), Hemorrhoids, Hiatal hernia (01/20/2021), IFG (impaired fasting glucose) (02/03/2021), Mixed hyperlipidemia (2019), Psoriasis (9/24/2020), Trochanteric bursitis of left hip (01/11/2022), Vitamin D insufficiency (2018), and Wears glasses.  Surgical History:  She  has a past surgical history that includes implantable breast prosthesis (Right, 1990); other surgical history  (2000); colonoscopy (11/29/2017); and cataract (Left, 04/14/2022).   Family History:  Her family history includes Arrhythmia in her brother; Cancer in her brother and brother; Dementia in her mother; Diabetes in her brother and sister; Heart Attack in her father; Heart Disease in her brother; High Blood Pressure in her brother, brother, and sister; No Known Problems in her maternal grandfather, maternal grandmother, paternal grandfather, and paternal grandmother.  Social History:  She  reports that she has never smoked. She has never used smokeless tobacco. She reports that she does not currently use alcohol. She reports that she does not use drugs.    Tobacco:  She has never smoked tobacco.    CAGE Alcohol Screen:   CAGE screening score of 0 on 3/2/2024, showing low risk of alcohol abuse.      Patient Care Team:  Leyda Alcala MD as PCP - General (Family Practice)  Ashley Solorzano MD (OBSTETRICS & GYNECOLOGY)  Alhaji Barrios PT as Physical Therapist    Review of Systems     Negative except itching of vulva, tinitis    Objective:   Physical Exam       /72   Pulse 71   Temp 97.9 °F (36.6 °C) (Temporal)   Resp 16   Ht 5' 5\" (1.651 m)   Wt 194 lb (88 kg)   SpO2 97%   BMI 32.28 kg/m²  Estimated body mass index is 32.28 kg/m² as calculated from the following:    Height as of this encounter: 5' 5\" (1.651 m).    Weight as of this encounter: 194 lb (88 kg).      GENERAL APPEARANCE:  Alert, no acute distress, appears stated age  HEENT:  Head- Normocephalic, atraumatic.    Eyes- Extraocular movements intact, pupils equally round and reactive to light    and accommodation, conjunctivae normal.    Ears- Tympanic membranes intact bilaterally.    Nose- Patent, normal septum and turbinates.    Mouth/Throat- Normal oral mucosa, throat non-erythematous.  NECK:  No submental, submandibular, ant/post cervical lymphadenopathy. No thyromegaly or masses.  PULMONARY:  Lungs clear to auscultation bilaterally. No  wheezes, rales, or rhonchi.  CARDIOVASCULAR:  Regular rate and rhythm. No murmurs, gallops, or rubs.  ABDOMEN:  + bowel sounds, soft, nontender, nondistended. No hepatosplenomegaly.  MUSCULOSKELETAL: Strength of upper and lower extremities 5/5 bilaterally. Normal gait.  NEUROLOGIC:  Cranial nerves 2-12 grossly intact.  PSYCHIATRIC:  Normal mood, affect, and hygiene.  Breasts: breast appear normal, R implant, NO implant on the L, no suspicious masses, no skin or nipple changes/discharge. No supraclavicular or axillary nodes.  Skin: L side vulva with ridge of skin, but no papule or open lesion or rash    Medicare Hearing Assessment:   Hearing Screening    Time taken: 3/6/2024  1:01 PM  Screening Method: Other  Other: pass               Visual Acuity:   Right Eye Visual Acuity: Uncorrected Right Eye Chart Acuity: 20/15   Left Eye Visual Acuity: Uncorrected Left Eye Chart Acuity: 20/15   Both Eyes Visual Acuity: Uncorrected Both Eyes Chart Acuity: 20/10   Able To Tolerate Visual Acuity: Yes        Assessment & Plan:   Jacki Asencio is a 68 year old female who presents for a Medicare Assessment.     1. Encounter for annual health examination (Primary)  2. Encounter for screening mammogram for malignant neoplasm of breast  3. Laboratory exam ordered as part of routine general medical examination  -     CBC With Differential With Platelet; Future; Expected date: 03/06/2024  -     Comp Metabolic Panel (14); Future; Expected date: 03/06/2024  -     Lipid Panel; Future; Expected date: 03/06/2024  4. Mixed hyperlipidemia  -     Comp Metabolic Panel (14); Future; Expected date: 03/06/2024  -     Lipid Panel; Future; Expected date: 03/06/2024  5. IFG (impaired fasting glucose)  -     Hemoglobin A1C; Future; Expected date: 03/06/2024  6. Vitamin D insufficiency  -     Vitamin D; Future; Expected date: 03/06/2024  7. Seborrheic dermatitis  -     Triamcinolone Acetonide; Apply topically 2 (two) times daily as needed.  Dispense:  28.4 g; Refill: 0  8. Vulvar itching  -     Triamcinolone Acetonide; Apply topically 2 (two) times daily as needed.  Dispense: 28.4 g; Refill: 0  9. Tinnitus of both ears  -     ENT Referral - In Network    The patient indicates understanding of these issues and agrees to the plan.  Reinforced healthy diet, lifestyle, and exercise.      Return in about 6 months (around 9/6/2024) for check vulvar itching.     Leyda Alcala MD, 3/6/2024     Supplementary Documentation:   General Health:  In the past six months, have you lost more than 10 pounds without trying?: 2 - No  Has your appetite been poor?: No  Type of Diet: Balanced  How does the patient maintain a good energy level?: Stretching  How would you describe your daily physical activity?: Light  How would you describe your current health state?: Good  How do you maintain positive mental well-being?: Social Interaction;Games  On a scale of 0 to 10, with 0 being no pain and 10 being severe pain, what is your pain level?: 2 - (Mild)  In the past six months, have you experienced urine leakage?: 1-Yes  At any time do you feel concerned for the safety/well-being of yourself and/or your children, in your home or elsewhere?: No  Have you had any immunizations at another office such as Influenza, Hepatitis B, Tetanus, or Pneumococcal?: Yes       Jacki Asencio's SCREENING SCHEDULE   Tests on this list are recommended by your physician but may not be covered, or covered at this frequency, by your insurer.   Please check with your insurance carrier before scheduling to verify coverage.   PREVENTATIVE SERVICES FREQUENCY &  COVERAGE DETAILS LAST COMPLETION DATE   Diabetes Screening    Fasting Blood Sugar /  Glucose    One screening every 12 months if never tested or if previously tested but not diagnosed with pre-diabetes   One screening every 6 months if diagnosed with pre-diabetes Lab Results   Component Value Date     (H) 03/19/2022        Cardiovascular  Disease Screening    Lipid Panel  Cholesterol  Lipoprotein (HDL)  Triglycerides Covered every 5 years for all Medicare beneficiaries without apparent signs or symptoms of cardiovascular disease Lab Results   Component Value Date    CHOLEST 244 (H) 03/19/2022    HDL 51 03/19/2022     (H) 03/19/2022    TRIG 169 (H) 03/19/2022         Electrocardiogram (EKG)   Covered if needed at Welcome to Medicare, and non-screening if indicated for medical reasons -      Ultrasound Screening for Abdominal Aortic Aneurysm (AAA) Covered once in a lifetime for one of the following risk factors    Men who are 65-75 years old and have ever smoked    Anyone with a family history -     Colorectal Cancer Screening  Covered for ages 50-85; only need ONE of the following:    Colonoscopy   Covered every 10 years    Covered every 2 years if patient is at high risk or previous colonoscopy was abnormal 11/29/2017    Health Maintenance   Topic Date Due    Colorectal Cancer Screening  11/29/2027       Flexible Sigmoidoscopy   Covered every 4 years -    Fecal Occult Blood Test Covered annually -   Bone Density Screening    Bone density screening    Covered every 2 years after age 65 if diagnosed with risk of osteoporosis or estrogen deficiency.    Covered yearly for long-term glucocorticoid medication use (Steroids) Last Dexa Scan:    XR DEXA BONE DENSITOMETRY (CPT=77080) 02/03/2021      No recommendations at this time   Pap and Pelvic    Pap   Covered every 2 years for women at normal risk; Annually if at high risk 12/09/2019  Health Maintenance   Topic Date Due    Pap Smear  12/09/2024       Chlamydia Annually if high risk -  No recommendations at this time   Screening Mammogram    Mammogram     Recommend annually for all female patients aged 40 and older    One baseline mammogram covered for patients aged 35-39 01/02/2024    Health Maintenance   Topic Date Due    Mammogram  01/02/2025       Immunizations    Influenza Covered once per flu  season  Please get every year 09/29/2023  No recommendations at this time    Pneumococcal Each vaccine ([id number] & [id number]) covered once after 65 Prevnar 13: -    [id number]: -     Pneumococcal Vaccination(1 of 1 - PCV) Never done    Hepatitis B One screening covered for patients with certain risk factors   -  No recommendations at this time    Tetanus Toxoid Not covered by Medicare Part B unless medically necessary (cut with metal); may be covered with your pharmacy prescription benefits -    Tetanus, Diptheria and Pertusis TD and TDaP Not covered by Medicare Part B -  No recommendations at this time    Zoster Not covered by Medicare Part B; may be covered with your pharmacy  prescription benefits -  Zoster Vaccines(1 of 2) Never done

## 2024-03-06 NOTE — PATIENT INSTRUCTIONS
Jacki Asencio's SCREENING SCHEDULE   Tests on this list are recommended by your physician but may not be covered, or covered at this frequency, by your insurer.   Please check with your insurance carrier before scheduling to verify coverage.   PREVENTATIVE SERVICES FREQUENCY &  COVERAGE DETAILS LAST COMPLETION DATE   Diabetes Screening    Fasting Blood Sugar /  Glucose    One screening every 12 months if never tested or if previously tested but not diagnosed with pre-diabetes   One screening every 6 months if diagnosed with pre-diabetes Lab Results   Component Value Date     (H) 03/19/2022        Cardiovascular Disease Screening    Lipid Panel  Cholesterol  Lipoprotein (HDL)  Triglycerides Covered every 5 years for all Medicare beneficiaries without apparent signs or symptoms of cardiovascular disease Lab Results   Component Value Date    CHOLEST 244 (H) 03/19/2022    HDL 51 03/19/2022     (H) 03/19/2022    TRIG 169 (H) 03/19/2022         Electrocardiogram (EKG)   Covered if needed at Welcome to Medicare, and non-screening if indicated for medical reasons -      Ultrasound Screening for Abdominal Aortic Aneurysm (AAA) Covered once in a lifetime for one of the following risk factors   • Men who are 65-75 years old and have ever smoked   • Anyone with a family history -     Colorectal Cancer Screening  Covered for ages 50-85; only need ONE of the following:    Colonoscopy   Covered every 10 years    Covered every 2 years if patient is at high risk or previous colonoscopy was abnormal 11/29/2017    Health Maintenance   Topic Date Due   • Colorectal Cancer Screening  11/29/2027       Flexible Sigmoidoscopy   Covered every 4 years -    Fecal Occult Blood Test Covered annually -   Bone Density Screening    Bone density screening    Covered every 2 years after age 65 if diagnosed with risk of osteoporosis or estrogen deficiency.    Covered yearly for long-term glucocorticoid medication use (Steroids) Last  Dexa Scan:    XR DEXA BONE DENSITOMETRY (CPT=77080) 02/03/2021      No recommendations at this time   Pap and Pelvic    Pap   Covered every 2 years for women at normal risk; Annually if at high risk 12/09/2019  Health Maintenance   Topic Date Due   • Pap Smear  12/09/2024       Chlamydia Annually if high risk -  No recommendations at this time   Screening Mammogram    Mammogram     Recommend annually for all female patients aged 40 and older    One baseline mammogram covered for patients aged 35-39 01/02/2024    Health Maintenance   Topic Date Due   • Mammogram  01/02/2025       Immunizations    Influenza Covered once per flu season  Please get every year 09/29/2023  No recommendations at this time    Pneumococcal Each vaccine ([id number] & [id number]) covered once after 65 Prevnar 13: -    [id number]: -     Pneumococcal Vaccination(1 of 1 - PCV) Never done    Hepatitis B One screening covered for patients with certain risk factors   -  No recommendations at this time    Tetanus Toxoid Not covered by Medicare Part B unless medically necessary (cut with metal); may be covered with your pharmacy prescription benefits -    Tetanus, Diptheria and Pertusis TD and TDaP Not covered by Medicare Part B -  No recommendations at this time    Zoster Not covered by Medicare Part B; may be covered with your pharmacy  prescription benefits -  Zoster Vaccines(1 of 2) Never done

## 2024-03-07 ENCOUNTER — LAB ENCOUNTER (OUTPATIENT)
Dept: LAB | Age: 69
End: 2024-03-07
Attending: FAMILY MEDICINE
Payer: MEDICARE

## 2024-03-07 DIAGNOSIS — Z00.00 LABORATORY EXAM ORDERED AS PART OF ROUTINE GENERAL MEDICAL EXAMINATION: ICD-10-CM

## 2024-03-07 DIAGNOSIS — E55.9 VITAMIN D INSUFFICIENCY: ICD-10-CM

## 2024-03-07 DIAGNOSIS — E78.2 MIXED HYPERLIPIDEMIA: ICD-10-CM

## 2024-03-07 DIAGNOSIS — R73.01 IFG (IMPAIRED FASTING GLUCOSE): ICD-10-CM

## 2024-03-07 LAB
ALBUMIN SERPL-MCNC: 4 G/DL (ref 3.4–5)
ALBUMIN/GLOB SERPL: 1.2 {RATIO} (ref 1–2)
ALP LIVER SERPL-CCNC: 67 U/L
ALT SERPL-CCNC: 21 U/L
ANION GAP SERPL CALC-SCNC: 3 MMOL/L (ref 0–18)
AST SERPL-CCNC: 13 U/L (ref 15–37)
BASOPHILS # BLD AUTO: 0.04 X10(3) UL (ref 0–0.2)
BASOPHILS NFR BLD AUTO: 0.8 %
BILIRUB SERPL-MCNC: 0.5 MG/DL (ref 0.1–2)
BUN BLD-MCNC: 13 MG/DL (ref 9–23)
CALCIUM BLD-MCNC: 9.1 MG/DL (ref 8.5–10.1)
CHLORIDE SERPL-SCNC: 109 MMOL/L (ref 98–112)
CHOLEST SERPL-MCNC: 233 MG/DL (ref ?–200)
CO2 SERPL-SCNC: 27 MMOL/L (ref 21–32)
CREAT BLD-MCNC: 0.66 MG/DL
EGFRCR SERPLBLD CKD-EPI 2021: 97 ML/MIN/1.73M2 (ref 60–?)
EOSINOPHIL # BLD AUTO: 0.09 X10(3) UL (ref 0–0.7)
EOSINOPHIL NFR BLD AUTO: 1.8 %
ERYTHROCYTE [DISTWIDTH] IN BLOOD BY AUTOMATED COUNT: 14.5 %
EST. AVERAGE GLUCOSE BLD GHB EST-MCNC: 137 MG/DL (ref 68–126)
FASTING PATIENT LIPID ANSWER: YES
FASTING STATUS PATIENT QL REPORTED: YES
GLOBULIN PLAS-MCNC: 3.3 G/DL (ref 2.8–4.4)
GLUCOSE BLD-MCNC: 108 MG/DL (ref 70–99)
HBA1C MFR BLD: 6.4 % (ref ?–5.7)
HCT VFR BLD AUTO: 42.3 %
HDLC SERPL-MCNC: 47 MG/DL (ref 40–59)
HGB BLD-MCNC: 14.3 G/DL
IMM GRANULOCYTES # BLD AUTO: 0.01 X10(3) UL (ref 0–1)
IMM GRANULOCYTES NFR BLD: 0.2 %
LDLC SERPL CALC-MCNC: 155 MG/DL (ref ?–100)
LYMPHOCYTES # BLD AUTO: 2 X10(3) UL (ref 1–4)
LYMPHOCYTES NFR BLD AUTO: 39.7 %
MCH RBC QN AUTO: 30 PG (ref 26–34)
MCHC RBC AUTO-ENTMCNC: 33.8 G/DL (ref 31–37)
MCV RBC AUTO: 88.7 FL
MONOCYTES # BLD AUTO: 0.49 X10(3) UL (ref 0.1–1)
MONOCYTES NFR BLD AUTO: 9.7 %
NEUTROPHILS # BLD AUTO: 2.41 X10 (3) UL (ref 1.5–7.7)
NEUTROPHILS # BLD AUTO: 2.41 X10(3) UL (ref 1.5–7.7)
NEUTROPHILS NFR BLD AUTO: 47.8 %
NONHDLC SERPL-MCNC: 186 MG/DL (ref ?–130)
OSMOLALITY SERPL CALC.SUM OF ELEC: 289 MOSM/KG (ref 275–295)
PLATELET # BLD AUTO: 267 10(3)UL (ref 150–450)
POTASSIUM SERPL-SCNC: 3.9 MMOL/L (ref 3.5–5.1)
PROT SERPL-MCNC: 7.3 G/DL (ref 6.4–8.2)
RBC # BLD AUTO: 4.77 X10(6)UL
SODIUM SERPL-SCNC: 139 MMOL/L (ref 136–145)
TRIGL SERPL-MCNC: 169 MG/DL (ref 30–149)
VIT D+METAB SERPL-MCNC: 17 NG/ML (ref 30–100)
VLDLC SERPL CALC-MCNC: 33 MG/DL (ref 0–30)
WBC # BLD AUTO: 5 X10(3) UL (ref 4–11)

## 2024-03-07 PROCEDURE — 80053 COMPREHEN METABOLIC PANEL: CPT

## 2024-03-07 PROCEDURE — 85025 COMPLETE CBC W/AUTO DIFF WBC: CPT

## 2024-03-07 PROCEDURE — 80061 LIPID PANEL: CPT

## 2024-03-07 PROCEDURE — 82306 VITAMIN D 25 HYDROXY: CPT

## 2024-03-07 PROCEDURE — 83036 HEMOGLOBIN GLYCOSYLATED A1C: CPT

## 2024-03-07 PROCEDURE — 36415 COLL VENOUS BLD VENIPUNCTURE: CPT

## 2024-03-11 DIAGNOSIS — E55.9 VITAMIN D INSUFFICIENCY: Primary | ICD-10-CM

## 2024-03-11 DIAGNOSIS — R73.01 IFG (IMPAIRED FASTING GLUCOSE): ICD-10-CM

## 2024-03-11 RX ORDER — ERGOCALCIFEROL 1.25 MG/1
50000 CAPSULE ORAL WEEKLY
Qty: 12 CAPSULE | Refills: 0 | Status: SHIPPED | OUTPATIENT
Start: 2024-03-11 | End: 2024-05-28

## 2024-08-14 ENCOUNTER — OFFICE VISIT (OUTPATIENT)
Dept: FAMILY MEDICINE CLINIC | Facility: CLINIC | Age: 69
End: 2024-08-14
Payer: MEDICARE

## 2024-08-14 VITALS
RESPIRATION RATE: 16 BRPM | HEART RATE: 70 BPM | TEMPERATURE: 98 F | OXYGEN SATURATION: 98 % | DIASTOLIC BLOOD PRESSURE: 70 MMHG | HEIGHT: 65 IN | BODY MASS INDEX: 32.49 KG/M2 | SYSTOLIC BLOOD PRESSURE: 120 MMHG | WEIGHT: 195 LBS

## 2024-08-14 DIAGNOSIS — R20.2 PARESTHESIA OF BILATERAL LEGS: ICD-10-CM

## 2024-08-14 DIAGNOSIS — H81.10 BENIGN PAROXYSMAL POSITIONAL VERTIGO, UNSPECIFIED LATERALITY: Primary | ICD-10-CM

## 2024-08-14 DIAGNOSIS — J30.1 SEASONAL ALLERGIC RHINITIS DUE TO POLLEN: ICD-10-CM

## 2024-08-14 DIAGNOSIS — L21.9 SEBORRHEIC DERMATITIS: ICD-10-CM

## 2024-08-14 DIAGNOSIS — H10.13 ALLERGIC CONJUNCTIVITIS OF BOTH EYES: ICD-10-CM

## 2024-08-14 PROCEDURE — 99214 OFFICE O/P EST MOD 30 MIN: CPT | Performed by: FAMILY MEDICINE

## 2024-08-14 RX ORDER — OLOPATADINE HYDROCHLORIDE 1 MG/ML
1 SOLUTION/ DROPS OPHTHALMIC 2 TIMES DAILY
Qty: 5 ML | Refills: 0 | Status: SHIPPED | OUTPATIENT
Start: 2024-08-14

## 2024-08-14 RX ORDER — METHYLPREDNISOLONE 4 MG/1
TABLET ORAL
Qty: 1 EACH | Refills: 0 | Status: SHIPPED | OUTPATIENT
Start: 2024-08-14

## 2024-08-14 RX ORDER — KETOCONAZOLE 20 MG/ML
SHAMPOO TOPICAL
Qty: 120 ML | Refills: 0 | Status: SHIPPED | OUTPATIENT
Start: 2024-08-15

## 2024-08-14 NOTE — PROGRESS NOTES
[facility] Visit Note  8/14/2024      Subjective:      Patient ID: Jacki Asencio is a 68 year old female.    Chief Complaint:  Chief Complaint   Patient presents with    Dizziness     States last wk her vertigo flared up. She did the Epley Maneuver and started taking her Meclizine which has helped, but having dizziness off & on since to the point yesterday she had to hold on to things. Today c/o her left ear/side of her face feels plugged up & has a headache. Took Sudafed & Flonase.    Leg Pain     \"Has a weird sensation in her legs to the point they sometimes hurt during the night & she has to change position multiple times to get relief\".       HPI:  Jacki Asencio is a 68 year old female who is being seen today for the above.      Dizziness- States last wk her vertigo flared up. She did the Epley Maneuver and started taking her Meclizine which has helped, but having dizziness off & on since to the point yesterday she had to hold on to things.    Yesterday started with left ear/side of her face plugged up sensation & headache. Took Sudafed & Flonase and xyzal + clear nasal discharge.  Itchy dry eyes and using OTC products but still not great.      Denies- fever, chills,       Leg pain- Has a weird sensation in her legs to the point they sometimes hurt during the night & she has to change position multiple times to get relief\".  Is from the buttock down on both sides. Has Back pain for which she goes to see a chiropractor once per month.  Whenever the pain is bothering her rates it about a 4-6 out of 10.  Currently is not bothering her.      Was to get repeat labs but not done yet. Reminded, already ordered.        Review of Systems - as stated above in the HPI      Objective:     Vitals:    08/14/24 1048   BP: 120/70   Pulse: 70   Resp: 16   Temp: 98.1 °F (36.7 °C)   TempSrc: Temporal   SpO2: 98%   Weight: 195 lb (88.5 kg)   Height: 5' 5\" (1.651 m)       Physical Examination   General:  Alert, in no  acute distress  HEENT: NCAT, EOMI, normal TMs, oropharynx, and nasal turbinates. No pain with palpation of sinuses.   Neck:  No cervical lymphadenopathy  CV: Regular rate and rhythm. No murmurs, gallops, or rubs.  Lungs:  Clear to auscultation B, no wheezes, rales, or rhonchi, normal respiratory effort  Abd:  +bowel sounds, soft  Ext:  No pedal edema,  Pedal pulses 2+ B        Assessment:     1. Benign paroxysmal positional vertigo, unspecified laterality  - methylPREDNISolone (MEDROL) 4 MG Oral Tablet Therapy Pack; As directed.  Dispense: 1 each; Refill: 0  -suspect worsened by her allergies  -if not improving with medrol dose pack to call and would get a CT brain +/- refer to neuro    2. Seasonal allergic rhinitis due to pollen  -cont txs    3. Allergic conjunctivitis of both eyes  - olopatadine 0.1 % Ophthalmic Solution; Place 1 drop into both eyes 2 (two) times daily.  Dispense: 5 mL; Refill: 0    4. Paresthesia of bilateral legs  - methylPREDNISolone (MEDROL) 4 MG Oral Tablet Therapy Pack; As directed.  Dispense: 1 each; Refill: 0  -Suspect sciatica which should be helped by the Medrol Dosepak however if this is not improving her symptoms she is to return to clinic to consider restless leg syndrome, vit b 12 defic, vs other.    5. Seborrheic dermatitis  - ketoconazole 2 % External Shampoo; Apply 5-10mL to wet scalp and affected areas of face/ears, lather, leave on 3-5 min, & rinse;a daily x 1 wk, then go to 2-3x/wk  Dispense: 120 mL; Refill: 0        Return for get labs I'll contact with results. If dizziness not improving call, If legs not improving see me.

## 2024-08-22 ENCOUNTER — TELEMEDICINE (OUTPATIENT)
Dept: TELEHEALTH | Age: 69
End: 2024-08-22
Payer: MEDICARE
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
## 2024-08-22 PROCEDURE — 99213 OFFICE O/P EST LOW 20 MIN: CPT | Performed by: NURSE PRACTITIONER

## 2024-08-22 RX ORDER — CEFUROXIME AXETIL 500 MG/1
500 TABLET ORAL 2 TIMES DAILY
Qty: 14 TABLET | Refills: 0 | Status: SHIPPED | OUTPATIENT
Start: 2024-08-22

## 2024-08-22 NOTE — PROGRESS NOTES
Virtual/Telephone Check-In    Jacki Asencio verbally consents to a Virtual/Telephone Check-In service on 08/22/24.  Patient has been referred to the [facility] website at [email or web address] to review the yearly Consent to Treat document.  Patient understands and accepts financial responsibility for any deductible, co-insurance and/or co-pays associated with this service.       Telehealth Verbal Consent   I conducted a telehealth visit with Jacki Asencio today, 08/22/24, which was completed using two-way, real-time interactive audio and video communication. This has been done in good iris to provide continuity of care in the best interest of the provider-patient relationship, due to the COVID -19 public health crisis/national emergency where restrictions of face-to-face office visits are ongoing. Every conscious effort was taken to allow for sufficient and adequate time to complete the visit.  The patient was made aware of the limitations of the telehealth visit, including treatment limitations as no physical exam could be performed.  The patient was advised to call 911 or to go to the ER in case there was an emergency.  The patient was also advised of the potential privacy & security concerns related to the telehealth platform.   The patient was made aware of where to find [facility]'s notice of privacy practices, telehealth consent form and other related consent forms and documents.  which are located on the [facility] website. The patient verbally agreed to telehealth consent form, related consents and the risks discussed.    Lastly, the patient confirmed that they were in [place].   Included in this visit, time may have been spent reviewing labs, medications, radiology tests and decision making. Appropriate medical decision-making and tests are ordered as detailed in the plan of care above.  Coding/billing information is submitted for this visit based on complexity of care and/or time spent for the visit.    CHIEF  COMPLAINT:     Chief Complaint   Patient presents with    Rash       HPI:   Jacki Asencio is a 68 year old female who presents for a video visit.  Patient reports bug bite that has become painful.    Was working in the yard a few days ago.  Later that night noticed a mildly itching, mildly tender bite to left neck.   Able to see punctum but there was no retained stinger.   Also sustained a bite on her leg.    The bite on her neck started to get more painful yesterday.  Today erythema has significantly increased and has slightly enlarged in size.  Area of bite is tender; today surrounding area anterior, posterior, and superior are also tender.  Reports increased warmth to area.   Denies drainage, fever, chills, body aches, or other signs of systemic sx.    Pt is concerned for infection.       Current Outpatient Medications   Medication Sig Dispense Refill    methylPREDNISolone (MEDROL) 4 MG Oral Tablet Therapy Pack As directed. 1 each 0    olopatadine 0.1 % Ophthalmic Solution Place 1 drop into both eyes 2 (two) times daily. 5 mL 0    ketoconazole 2 % External Shampoo Apply 5-10mL to wet scalp and affected areas of face/ears, lather, leave on 3-5 min, & rinse;a daily x 1 wk, then go to 2-3x/wk 120 mL 0    triamcinolone 0.1 % External Cream Apply topically 2 (two) times daily as needed. 28.4 g 0    Fluticasone Propionate (FLONASE ALLERGY RELIEF NA)       meclizine 25 MG Oral Tab Take 1 tablet (25 mg total) by mouth 3 (three) times daily as needed. 20 tablet 0    clindamycin 1 % External Lotion Apply 1 Application topically 2 (two) times daily as needed (folliciulitis of legs). 60 mL 1    clobetasol 0.05 % External Solution Apply topically 2 (two) times daily.      Daily Multiple Vitamins Oral Tab Take 1 tablet by mouth daily.        Past Medical History:    Allergic rhinitis    Back pain    Constipation    COVID-19 virus infection    + test on 9/3/20    Hemorrhoids    Hiatal hernia    small, seen on cardiac overread  for coronary Ca scoring    IFG (impaired fasting glucose)    Mixed hyperlipidemia    Psoriasis    Trochanteric bursitis of left hip    Vitamin D insufficiency    Wears glasses      Past Surgical History:   Procedure Laterality Date    Cataract Left 04/14/2022    Dr. Chavez    Colonoscopy  11/29/2017    repeat 10 yrs, Dr. Reeves, [facility]    Implantable breast prosthesis Right 1990    Other surgical history  2000    Urethral Sling         Social History     Socioeconomic History    Marital status:    Tobacco Use    Smoking status: Never    Smokeless tobacco: Never   Vaping Use    Vaping status: Never Used   Substance and Sexual Activity    Alcohol use: Not Currently     Alcohol/week: 0.0 standard drinks of alcohol     Comment: 1 glass wine few times, never a problem    Drug use: No    Sexual activity: Yes     Partners: Male     Birth control/protection: Vasectomy   Other Topics Concern    Caffeine Concern Yes     Comment: 1 cup of coffee x day    Exercise Yes     Comment: 3 x week, yoga, weights    Seat Belt Yes         REVIEW OF SYSTEMS:   GENERAL: normal appetite  SKIN: see HPI  HEENT: See HPI  LUNGS: no shortness of breath or wheezing, See HPI  CARDIOVASCULAR: no chest pain or palpitations   GI: see HPI  NEURO: see HPI    EXAM:   General: Alert, Well-appearing, and In no acute distress  Respiratory:   Speaking in full sentences comfortably  Normal work of breathing  No cough during visit  Head: Normocephalic  Eyes: Conjunctiva clear  Nose: No obvious nasal discharge.  Skin: Lesion: left lower anterolateral neck with area of erythema with punctum at center; this area has mild swelling; + tenderness with self palpation; no drainage  Mood: Affect appropriate      ASSESSMENT AND PLAN:   Jacki Asencio is a 68 year old female who presents with symptoms that are consistent with    ASSESSMENT/PLAN:     Diagnoses and all orders for this visit:    Bug bite with infection, initial encounter  -     cefuroxime 500 MG  Oral Tab; Take 1 tablet (500 mg total) by mouth 2 (two) times daily.      Discussed limitations of telehealth.  Offered for pt to go to [facility] or  for in person exam; pt declined.   Pt has allergy to PCN.  Reports had hives to abd and arms approx 10 yr ago with PCN use.  Denies any other sx occurring with hives; denies SOB, swelling, dizziness, GI sx, palpitations with allergic rxn  Pt doesn't recall if previously had a cephalosporin  Will treat with medication as listed  Discussed use, dose, and possible side effects; advised of low likelihood of cross reactivity with PCN allergy.  Pt to monitor for sx and stop med and seek medical attention if occurs.   Comfort measures as per pt instructions  To f/u with PCP if no improvement in 2-3 days or sooner if new/worsening symptoms develop  See pt instructions      Patient Instructions   Complete entire course of antibiotics  Wash area with soap and water; apply warm moist compress to area four times daily for 15 minutes at a time  Acetaminophen or ibuprofen for pain if no contraindication to use  Probiotic or yogurt daily for 2-3 weeks due to antibiotic use  Monitor for healing. Call your PCP immediately or go to Immediate Care for increase in redness, pain, warmth, swelling, fever, red streaking, or increase in discharge from wound.    Seek immediate medical care for signs of an allergic reaction  Follow up with your PCP if no improvement in 2-3 days.          The patient indicates understanding of these issues and agrees to the plan.    Face to face time spent on Video Visit: 22 min  Total Time spent on visit including reviewing history, ordering labs/medication, patient examination and education: 15 min

## 2024-08-22 NOTE — PATIENT INSTRUCTIONS
Complete entire course of antibiotics  Wash area with soap and water; apply warm moist compress to area four times daily for 15 minutes at a time  Acetaminophen or ibuprofen for pain if no contraindication to use  Probiotic or yogurt daily for 2-3 weeks due to antibiotic use  Monitor for healing. Call your PCP immediately or go to Immediate Care for increase in redness, pain, warmth, swelling, fever, red streaking, or increase in discharge from wound.    Seek immediate medical care for signs of an allergic reaction  Follow up with your PCP if no improvement in 2-3 days.

## 2024-08-31 ENCOUNTER — PATIENT MESSAGE (OUTPATIENT)
Dept: FAMILY MEDICINE CLINIC | Facility: CLINIC | Age: 69
End: 2024-08-31

## 2024-08-31 DIAGNOSIS — L21.9 SEBORRHEIC DERMATITIS: ICD-10-CM

## 2024-08-31 DIAGNOSIS — L29.2 VULVAR ITCHING: ICD-10-CM

## 2024-08-31 DIAGNOSIS — H10.13 ALLERGIC CONJUNCTIVITIS OF BOTH EYES: ICD-10-CM

## 2024-09-03 RX ORDER — TRIAMCINOLONE ACETONIDE 1 MG/G
CREAM TOPICAL 2 TIMES DAILY PRN
Qty: 28.4 G | Refills: 0 | Status: SHIPPED | OUTPATIENT
Start: 2024-09-03

## 2024-09-03 RX ORDER — OLOPATADINE HYDROCHLORIDE 1 MG/ML
1 SOLUTION/ DROPS OPHTHALMIC 2 TIMES DAILY
Qty: 5 ML | Refills: 0 | Status: CANCELLED
Start: 2024-09-03

## 2024-09-03 NOTE — TELEPHONE ENCOUNTER
From: Jacki Asencio  To: Leyda Alcala  Sent: 8/31/2024 8:24 AM CDT  Subject: Request a prescription     Dr Alcala,   Can you please call in a prescription for   Triamcinolone Acetonide  Ointment USP, 0.1 %   Thank you  Jacki

## 2024-09-03 NOTE — TELEPHONE ENCOUNTER
"Post-op Note    Subjective   Jovanny Fam is a 42 y.o. male s/p lap appy performed on 12/2/21 as an interval appy for perforated appendicitis. Overall doing well. No pain, fevers, N/V, or diarrhea.        Objective   /87 (BP Location: Right arm, Patient Position: Sitting, Cuff Size: Large Adult)   Pulse 85   Temp 97.5 °F (36.4 °C) (Infrared)   Resp 18   Ht 188 cm (74\")   Wt 112 kg (247 lb 6.4 oz)   SpO2 96%   BMI 31.76 kg/m²   Incisions well healed without any erythema, induration, or drainage      Assessment/Plan   Patient is a 42 year old gentleman s/p interval lap appy on 12/2/2021 for perforated acute appendicitis. Overall doing well.    No heavy lifting for 6 weeks after surgery  Regular diet as tolerated  F/u prn    Semaj Juarez MD  12/21/2021  08:52 EST  " Requesting Triamcinolone cream  Last OV: 8/14/24  RTC: prn  Last Rx'd 3/6/24 #28.4g with 0 refills    Future Appointments   Date Time Provider Department Center   9/6/2024 11:00 AM Leyda Alcala MD [facility] [address] [facility] [address]       Non-protocol med:  Rx pended and routed for approval/denial

## 2024-09-16 ENCOUNTER — PATIENT MESSAGE (OUTPATIENT)
Dept: FAMILY MEDICINE CLINIC | Facility: CLINIC | Age: 69
End: 2024-09-16

## 2024-09-17 NOTE — TELEPHONE ENCOUNTER
From: Jacki Asencio  To: Leyda Alcala  Sent: 9/16/2024 2:51 PM CDT  Subject: Allergies     Dr Alcala, can you prescribe Montelukast 10 mg, I tried it and it helped with my allergies. You prescribed it to me once before.

## 2024-09-17 NOTE — TELEPHONE ENCOUNTER
Last OV 8/14/24  1. Benign paroxysmal positional vertigo, unspecified laterality  - methylPREDNISolone (MEDROL) 4 MG Oral Tablet Therapy Pack; As directed.  Dispense: 1 each; Refill: 0  -suspect worsened by her allergies  -if not improving with medrol dose pack to call and would get a CT brain +/- refer to neuro         Patient has not been prescribed montelukast since 2022- please advise if she needs appt

## 2024-09-21 RX ORDER — MONTELUKAST SODIUM 10 MG/1
10 TABLET ORAL NIGHTLY PRN
Qty: 90 TABLET | Refills: 1 | Status: SHIPPED | OUTPATIENT
Start: 2024-09-21

## 2024-11-01 DIAGNOSIS — H81.13 BPPV (BENIGN PAROXYSMAL POSITIONAL VERTIGO), BILATERAL: ICD-10-CM

## 2024-11-01 RX ORDER — MECLIZINE HYDROCHLORIDE 25 MG/1
25 TABLET ORAL 3 TIMES DAILY PRN
Qty: 20 TABLET | Refills: 0 | Status: SHIPPED | OUTPATIENT
Start: 2024-11-01

## 2024-11-01 NOTE — TELEPHONE ENCOUNTER
eclizine 25 MG Oral Tab          Sig: Take 1 tablet (25 mg total) by mouth 3 (three) times daily as needed.    Disp: 20 tablet    Refills: 0    Start: 11/1/2024    Class: Normal    Non-formulary For: BPPV (benign paroxysmal positional vertigo), bilateral    Last ordered: 1 year ago (9/29/2023) by Leyda Alcala MD    Gastrointestional Medication Protocol [id number]/01/2024 12:41 PM   Protocol Details In person appointment or virtual visit in the past 12 mos or appointment in next 3 mos      To be filled at: [facility]/pharmacy #[id number] [address] [facility] [phone], [phone]     LOV: 8/14/24  Last wellness visit:  3/6/24  Last Relevant Labs: 3/7/24  Filled: 9/23/23 #20 with 0 refills    No future appointments.

## 2024-12-30 ENCOUNTER — PATIENT MESSAGE (OUTPATIENT)
Dept: FAMILY MEDICINE CLINIC | Facility: CLINIC | Age: 69
End: 2024-12-30

## 2024-12-30 DIAGNOSIS — Z12.31 ENCOUNTER FOR SCREENING MAMMOGRAM FOR BREAST CANCER: Primary | ICD-10-CM

## 2024-12-30 RX ORDER — MONTELUKAST SODIUM 10 MG/1
10 TABLET ORAL NIGHTLY PRN
Qty: 90 TABLET | Refills: 1 | OUTPATIENT
Start: 2024-12-30

## 2024-12-30 NOTE — TELEPHONE ENCOUNTER
Last Mammogram 1/2/24  CONCLUSION:       BI-RADS CATEGORY:    DIAGNOSTIC CATEGORY 2--BENIGN FINDING NO CHANGE FROM COMPARISON ASSESSMENT.       RECOMMENDATIONS:    ROUTINE MAMMOGRAM AND CLINICAL EVALUATION IN 12 MONTHS.     Order placed

## 2025-01-02 ENCOUNTER — TELEMEDICINE (OUTPATIENT)
Dept: TELEHEALTH | Age: 70
End: 2025-01-02
Payer: MEDICARE

## 2025-01-02 DIAGNOSIS — J06.9 VIRAL URI: Primary | ICD-10-CM

## 2025-01-02 PROCEDURE — 99213 OFFICE O/P EST LOW 20 MIN: CPT | Performed by: NURSE PRACTITIONER

## 2025-01-02 NOTE — PATIENT INSTRUCTIONS
Push fluids   Flonase  Decongestant such as sudafed for the next few days if needed  Follow up in person for new/ worsening symptoms or if not improving over the next few days.

## 2025-01-02 NOTE — PROGRESS NOTES
Virtual/Telephone Check-In    Jacki Asencio verbally consents to a Virtual/Telephone Check-In service on 01/02/25.  Patient has been referred to the [facility] website at [email or web address] to review the yearly Consent to Treat document.  Patient understands and accepts financial responsibility for any deductible, co-insurance and/or co-pays associated with this service.       Telehealth Verbal Consent   I conducted a telehealth visit with Jacki Asencio today, 01/02/25, which was completed using two-way, real-time interactive audio and video communication. This has been done in good iris to provide continuity of care in the best interest of the provider-patient relationship. Every conscious effort was taken to allow for sufficient and adequate time to complete the visit.  The patient was made aware of the limitations of the telehealth visit, including treatment limitations as physical exam through video visit is limited.  The patient was advised to call 911 or to go to the ER in case there was an emergency.  The patient was also advised of the potential privacy & security concerns related to the telehealth platform.   The patient was made aware of where to find [facility]'s notice of privacy practices, telehealth consent form and other related consent forms and documents.  which are located on the [facility] website. The patient verbally agreed to telehealth consent form, related consents and the risks discussed.    Lastly, the patient confirmed that they were in [place].   Included in this visit, time may have been spent reviewing labs, medications, radiology tests and decision making. Appropriate medical decision-making and tests are ordered as detailed in the plan of care above.  Coding/billing information is submitted for this visit based on complexity of care and/or time spent for the visit.    CHIEF COMPLAINT:     Chief Complaint   Patient presents with    Cold       HPI:   Jacki Asencio is a 69 year old female who  presents for a video visit.  Patient reports symptoms started 12/28 with sinus congestion, sinus pressure, ear pressure and sneezing. Has occasional cough which she feels is due to dry throat. Denies ill contacts.  Patient has tried sudafed PEand tylenol for symptoms. Took flonase 3 days ago, none since then. No fever. Has not tested for COVID since onset of symptoms.     Current Outpatient Medications   Medication Sig Dispense Refill    meclizine 25 MG Oral Tab Take 1 tablet (25 mg total) by mouth 3 (three) times daily as needed. 20 tablet 0    montelukast 10 MG Oral Tab Take 1 tablet (10 mg total) by mouth nightly as needed (allergies). 90 tablet 1    triamcinolone 0.1 % External Cream Apply topically 2 (two) times daily as needed. 28.4 g 0    cefuroxime 500 MG Oral Tab Take 1 tablet (500 mg total) by mouth 2 (two) times daily. 14 tablet 0    methylPREDNISolone (MEDROL) 4 MG Oral Tablet Therapy Pack As directed. (Patient not taking: Reported on 8/22/2024) 1 each 0    olopatadine 0.1 % Ophthalmic Solution Place 1 drop into both eyes 2 (two) times daily. 5 mL 0    ketoconazole 2 % External Shampoo Apply 5-10mL to wet scalp and affected areas of face/ears, lather, leave on 3-5 min, & rinse;a daily x 1 wk, then go to 2-3x/wk 120 mL 0    Fluticasone Propionate (FLONASE ALLERGY RELIEF NA)       clindamycin 1 % External Lotion Apply 1 Application topically 2 (two) times daily as needed (folliciulitis of legs). 60 mL 1    clobetasol 0.05 % External Solution Apply topically 2 (two) times daily.      Daily Multiple Vitamins Oral Tab Take 1 tablet by mouth daily.        Past Medical History:    Allergic rhinitis    Back pain    Constipation    COVID-19 virus infection    + test on 9/3/20    Hemorrhoids    Hiatal hernia    small, seen on cardiac overread for coronary Ca scoring    IFG (impaired fasting glucose)    Mixed hyperlipidemia    Psoriasis    Trochanteric bursitis of left hip    Vitamin D insufficiency    Wears  glasses      Past Surgical History:   Procedure Laterality Date    Cataract Left 04/14/2022    Dr. Chavez    Colonoscopy  11/29/2017    repeat 10 yrs, Dr. Reeves, [facility]    Implantable breast prosthesis Right 1990    Other surgical history  2000    Urethral Sling         Social History     Socioeconomic History    Marital status:    Tobacco Use    Smoking status: Never    Smokeless tobacco: Never   Vaping Use    Vaping status: Never Used   Substance and Sexual Activity    Alcohol use: Not Currently     Alcohol/week: 0.0 standard drinks of alcohol     Comment: 1 glass wine few times, never a problem    Drug use: No    Sexual activity: Yes     Partners: Male     Birth control/protection: Vasectomy   Other Topics Concern    Caffeine Concern Yes     Comment: 1 cup of coffee x day    Exercise Yes     Comment: 3 x week, yoga, weights    Seat Belt Yes         REVIEW OF SYSTEMS:   GENERAL: no fever. No body aches.    SKIN: no rashes or abnormal skin lesions  HEENT: See HPI  LUNGS: denies shortness of breath or wheezing, See HPI  CARDIOVASCULAR: denies chest pain or palpitations   GI: denies N/V/C or abdominal pain  NEURO: Denies dizziness or weakness. Admits sinus headache but states it is not severe    EXAM:   General: Alert, Well-appearing, and In no acute distress  Respiratory:   Speaking in full sentences comfortably  Normal work of breathing  No cough during visit  Head: Normocephalic  Nose: No obvious nasal discharge.  Skin: No obvious rashes or lesions from what observed.     No results found for this or any previous visit (from the past 24 hours).    ASSESSMENT AND PLAN:   Jacki Asencio is a 69 year old female who presents with symptoms that are consistent with    ASSESSMENT:   Encounter Diagnosis   Name Primary?    Viral URI Yes       PLAN:   Discussed comfort measures  Advised follow up for new or worsening symptoms or if not improving the next few days.     Meds & Refills for this Visit:  Requested  Prescriptions      No prescriptions requested or ordered in this encounter       Risks, benefits, and side effects of medication explained and discussed.    Patient Instructions   Push fluids   Flonase  Decongestant such as sudafed for the next few days if needed  Follow up in person for new/ worsening symptoms or if not improving over the next few days.     The patient indicates understanding of these issues and agrees to the plan.  The patient is asked to return if sx's persist or worsen.    Jacki Asencio understands video visit evaluation is not a substitute for face-to-face examination or emergency care. Patient advised to go to ER or call 911 for worsening symptoms or acute distress.

## 2025-01-07 ENCOUNTER — OFFICE VISIT (OUTPATIENT)
Dept: FAMILY MEDICINE CLINIC | Facility: CLINIC | Age: 70
End: 2025-01-07
Payer: MEDICARE

## 2025-01-07 VITALS
OXYGEN SATURATION: 99 % | HEIGHT: 65 IN | TEMPERATURE: 98 F | SYSTOLIC BLOOD PRESSURE: 122 MMHG | DIASTOLIC BLOOD PRESSURE: 62 MMHG | BODY MASS INDEX: 33.15 KG/M2 | RESPIRATION RATE: 16 BRPM | WEIGHT: 199 LBS | HEART RATE: 74 BPM

## 2025-01-07 DIAGNOSIS — J01.10 ACUTE NON-RECURRENT FRONTAL SINUSITIS: Primary | ICD-10-CM

## 2025-01-07 DIAGNOSIS — E66.09 OBESITY DUE TO EXCESS CALORIES WITHOUT SERIOUS COMORBIDITY, UNSPECIFIED CLASS: ICD-10-CM

## 2025-01-07 PROCEDURE — 99213 OFFICE O/P EST LOW 20 MIN: CPT | Performed by: FAMILY MEDICINE

## 2025-01-07 PROCEDURE — G2211 COMPLEX E/M VISIT ADD ON: HCPCS | Performed by: FAMILY MEDICINE

## 2025-01-07 RX ORDER — DOXYCYCLINE HYCLATE 100 MG
100 TABLET ORAL 2 TIMES DAILY
Qty: 14 TABLET | Refills: 0 | Status: SHIPPED | OUTPATIENT
Start: 2025-01-07 | End: 2025-01-14

## 2025-01-07 NOTE — PROGRESS NOTES
[facility] Visit Note  1/7/2025      Subjective:      Patient ID: Jacki Asencio is a 69 year old female.    Chief Complaint:  Chief Complaint   Patient presents with    Sinus Problem     c/o head congestion, drainage down the back of her throat & ears feel plugged. Has been taking Sudafed 12hr & Flonase.       HPI:  Jacki Asencio is a 69 year old female who is being seen today for the above.      Sick-  symptoms started 11 days ago on 12/28/24 and improved but now head congestion, drainage down the back of her throat & ears feel plugged. +headaches  Vomited x 1. Has been taking Sudafed 12hr & Flonase. Mild cough when dry.     Denies- fever, chills, shortness of breath, chest pain, cough, sore throat, loss of taste/smell, myalgia, nausea, diarrhea      Obesity- is Interested in none medicine ways to work with her weight loss and would like to see the weight loss clinic.      Review of Systems - as stated above in the HPI      Objective:     Vitals:    01/07/25 1506   BP: 122/62   Pulse: 74   Resp: 16   Temp: 98.4 °F (36.9 °C)   TempSrc: Temporal   SpO2: 99%   Weight: 199 lb (90.3 kg)   Height: 5' 5\" (1.651 m)       Physical Examination   General:  Alert, in no acute distress  HEENT: NCAT, EOMI, normal TMs, oropharynx, and nasal turbinates.+ pain with palpation of L frontal sinus.  Neck:  No cervical lymphadenopathy  CV: Regular rate and rhythm. No murmurs, gallops, or rubs.  Lungs:  Clear to auscultation B, no wheezes, rales, or rhonchi, normal respiratory effort  Abd:  +bowel sounds, soft  Ext:  No pedal edema,  Pedal pulses 2+ B        Assessment:     1. Acute non-recurrent frontal sinusitis  - Doxycycline Hyclate 100 MG Oral Tab; Take 1 tablet (100 mg total) by mouth 2 (two) times daily for 7 days.  Dispense: 14 tablet; Refill: 0    2. Obesity due to excess calories without serious comorbidity, unspecified class  - [facility] - Rachael Ventura PA-C [id number]  [place]        I am providing care as part of an ongoing, longitudinal care relationship.    Return for if symptoms worsen/don't improve, annual physical in March.

## 2025-01-28 ENCOUNTER — HOSPITAL ENCOUNTER (OUTPATIENT)
Dept: MAMMOGRAPHY | Age: 70
Discharge: HOME | End: 2025-01-28
Attending: FAMILY MEDICINE
Payer: MEDICARE

## 2025-01-28 ENCOUNTER — LAB ENCOUNTER (OUTPATIENT)
Dept: LAB | Age: 70
End: 2025-01-28
Attending: FAMILY MEDICINE
Payer: MEDICARE

## 2025-01-28 DIAGNOSIS — R73.01 IFG (IMPAIRED FASTING GLUCOSE): ICD-10-CM

## 2025-01-28 DIAGNOSIS — Z12.31 ENCOUNTER FOR SCREENING MAMMOGRAM FOR BREAST CANCER: ICD-10-CM

## 2025-01-28 DIAGNOSIS — E55.9 VITAMIN D INSUFFICIENCY: ICD-10-CM

## 2025-01-28 LAB
EST. AVERAGE GLUCOSE BLD GHB EST-MCNC: 128 MG/DL (ref 68–126)
HBA1C MFR BLD: 6.1 % (ref ?–5.7)
VIT D+METAB SERPL-MCNC: 18.9 NG/ML (ref 30–100)

## 2025-01-28 PROCEDURE — 83036 HEMOGLOBIN GLYCOSYLATED A1C: CPT

## 2025-01-28 PROCEDURE — 36415 COLL VENOUS BLD VENIPUNCTURE: CPT

## 2025-01-28 PROCEDURE — 77063 BREAST TOMOSYNTHESIS BI: CPT | Performed by: FAMILY MEDICINE

## 2025-01-28 PROCEDURE — 82306 VITAMIN D 25 HYDROXY: CPT

## 2025-01-28 PROCEDURE — 77067 SCR MAMMO BI INCL CAD: CPT | Performed by: FAMILY MEDICINE

## 2025-01-29 ENCOUNTER — OFFICE VISIT (OUTPATIENT)
Dept: FAMILY MEDICINE CLINIC | Facility: CLINIC | Age: 70
End: 2025-01-29
Payer: MEDICARE

## 2025-01-29 VITALS
SYSTOLIC BLOOD PRESSURE: 132 MMHG | HEART RATE: 62 BPM | RESPIRATION RATE: 16 BRPM | WEIGHT: 203.81 LBS | BODY MASS INDEX: 34 KG/M2 | TEMPERATURE: 98 F | OXYGEN SATURATION: 97 % | DIASTOLIC BLOOD PRESSURE: 84 MMHG

## 2025-01-29 DIAGNOSIS — H69.92 EUSTACHIAN TUBE DYSFUNCTION, LEFT: Primary | ICD-10-CM

## 2025-01-29 PROCEDURE — 99213 OFFICE O/P EST LOW 20 MIN: CPT | Performed by: NURSE PRACTITIONER

## 2025-01-29 NOTE — PATIENT INSTRUCTIONS
Flonase  Decongestant  Follow up with your primary care doctor or ENT if not improving over the next few days.   Follow up sooner for new/ worsening symptoms

## 2025-01-29 NOTE — PROGRESS NOTES
CHIEF COMPLAINT:     Chief Complaint   Patient presents with    Ear Problem     Ear infection - Entered by patient  Left ear pain for 3 days, vertigo at AM.  No drops, OTC meds taken for pain       HPI:   Jacki Asencio is a 69 year old female who presents for sensation of left ear feeling clogged starting 3 days ago after being on a plane. Felt like her ear \"popped\" while on the plane and ear symptoms never resolved since then. She then began to experience some vertigo sxs with movements of the head or change in position. She has hx of vertigo and treated herself with meclizine which helped resolve the vertigo, no dizziness at this time. She took some sudafed this morning as well which has also seemed to help the ear symptoms. Reports had sinus sxs about 2 weeks ago that are now improved. She denies any ear pain.     Current Outpatient Medications   Medication Sig Dispense Refill    meclizine 25 MG Oral Tab Take 1 tablet (25 mg total) by mouth 3 (three) times daily as needed. (Patient not taking: Reported on 1/7/2025) 20 tablet 0    montelukast 10 MG Oral Tab Take 1 tablet (10 mg total) by mouth nightly as needed (allergies). 90 tablet 1    triamcinolone 0.1 % External Cream Apply topically 2 (two) times daily as needed. 28.4 g 0    ketoconazole 2 % External Shampoo Apply 5-10mL to wet scalp and affected areas of face/ears, lather, leave on 3-5 min, & rinse;a daily x 1 wk, then go to 2-3x/wk 120 mL 0    Fluticasone Propionate (FLONASE ALLERGY RELIEF NA) 1 spray by Nasal route daily as needed.      clindamycin 1 % External Lotion Apply 1 Application topically 2 (two) times daily as needed (folliciulitis of legs). 60 mL 1    clobetasol 0.05 % External Solution Apply topically 2 (two) times daily.      Daily Multiple Vitamins Oral Tab Take 1 tablet by mouth daily.        Past Medical History:    Allergic rhinitis    Back pain    Constipation    COVID-19 virus infection    + test on 9/3/20    Hemorrhoids    Hiatal  hernia    small, seen on cardiac overread for coronary Ca scoring    IFG (impaired fasting glucose)    Mixed hyperlipidemia    Psoriasis    Trochanteric bursitis of left hip    Vitamin D insufficiency    Wears glasses      Past Surgical History:   Procedure Laterality Date    Cataract Left 04/14/2022    Dr. Chavez    Colonoscopy  11/29/2017    repeat 10 yrs, Dr. Reeves, [facility]    Implantable breast prosthesis Right 1990    Other surgical history  2000    Urethral Sling         Social History     Socioeconomic History    Marital status:    Tobacco Use    Smoking status: Never    Smokeless tobacco: Never   Vaping Use    Vaping status: Never Used   Substance and Sexual Activity    Alcohol use: Not Currently     Alcohol/week: 0.0 standard drinks of alcohol     Comment: 1 glass wine few times, never a problem    Drug use: No    Sexual activity: Yes     Partners: Male     Birth control/protection: Vasectomy   Other Topics Concern    Caffeine Concern Yes     Comment: 1 cup of coffee x day    Exercise Yes     Comment: 3 x week, yoga, weights    Seat Belt Yes         REVIEW OF SYSTEMS:   GENERAL: normal appetite. No fever  SKIN: no rashes or abnormal skin lesions  HEENT: See HPI  LUNGS: denies shortness of breath or wheezing, See HPI  CARDIOVASCULAR: denies chest pain or palpitations   GI: denies N/V/C or abdominal pain  NEURO: Denies dizziness or weakness    EXAM:   /84   Pulse 62   Temp 98.1 °F (36.7 °C) (Oral)   Resp 16   Wt 203 lb 12.8 oz (92.4 kg)   SpO2 97%   BMI 33.91 kg/m²   GENERAL: well developed, well nourished,in no apparent distress  SKIN: no rashes,no suspicious lesions  HEAD: atraumatic, normocephalic.    EYES: conjunctiva clear, EOM intact  EARS: TM's gray, no bulging, no retraction,+ fluid level to left TM, bony landmarks visible  NOSE: Nostrils patent, no nasal discharge   THROAT: Oral mucosa pink, moist. Posterior pharynx is non erythematous.  NECK: Supple, non-tender  LUNGS: clear to  auscultation bilaterally, no wheezes or rhonchi. Breathing is non labored.  CARDIO: RRR without murmur  EXTREMITIES: no cyanosis, clubbing or edema  LYMPH:  no lymphadenopathy.    NEURO: A&O x 4 CN II-XII intact.       ASSESSMENT AND PLAN:   Jacki Asencio is a 69 year old female who presents with upper respiratory symptoms that are consistent with    ASSESSMENT:   Encounter Diagnosis   Name Primary?    Eustachian tube dysfunction, left Yes       PLAN:     Comfort care per pt instructions.   To follow up for any new or worsening symptoms  To follow up with pcp or ENT if not improving with the above measures over the next few days.     Meds & Refills for this Visit:  Requested Prescriptions      No prescriptions requested or ordered in this encounter       Risks, benefits, and side effects of medication explained and discussed.    Patient Instructions   Flonase  Decongestant  Follow up with your primary care doctor or ENT if not improving over the next few days.   Follow up sooner for new/ worsening symptoms    The patient indicates understanding of these issues and agrees to the plan.  The patient is asked to return if sx's persist or worsen.

## 2025-02-01 DIAGNOSIS — E55.9 VITAMIN D DEFICIENCY: Primary | ICD-10-CM

## 2025-02-01 RX ORDER — ERGOCALCIFEROL 1.25 MG/1
50000 CAPSULE, LIQUID FILLED ORAL WEEKLY
Qty: 4 CAPSULE | Refills: 2 | Status: SHIPPED | OUTPATIENT
Start: 2025-02-01

## 2025-03-28 ENCOUNTER — OFFICE VISIT (OUTPATIENT)
Dept: FAMILY MEDICINE CLINIC | Facility: CLINIC | Age: 70
End: 2025-03-28
Payer: MEDICARE

## 2025-03-28 VITALS
HEIGHT: 65 IN | DIASTOLIC BLOOD PRESSURE: 70 MMHG | BODY MASS INDEX: 33.32 KG/M2 | WEIGHT: 200 LBS | RESPIRATION RATE: 16 BRPM | OXYGEN SATURATION: 97 % | TEMPERATURE: 98 F | SYSTOLIC BLOOD PRESSURE: 120 MMHG | HEART RATE: 64 BPM

## 2025-03-28 DIAGNOSIS — J30.89 NON-SEASONAL ALLERGIC RHINITIS, UNSPECIFIED TRIGGER: ICD-10-CM

## 2025-03-28 DIAGNOSIS — Z12.31 ENCOUNTER FOR SCREENING MAMMOGRAM FOR MALIGNANT NEOPLASM OF BREAST: ICD-10-CM

## 2025-03-28 DIAGNOSIS — S39.012D LUMBAR STRAIN, SUBSEQUENT ENCOUNTER: ICD-10-CM

## 2025-03-28 DIAGNOSIS — R73.01 IFG (IMPAIRED FASTING GLUCOSE): ICD-10-CM

## 2025-03-28 DIAGNOSIS — E55.9 VITAMIN D DEFICIENCY: ICD-10-CM

## 2025-03-28 DIAGNOSIS — Z00.00 ENCOUNTER FOR ANNUAL HEALTH EXAMINATION: Primary | ICD-10-CM

## 2025-03-28 DIAGNOSIS — E78.2 MIXED HYPERLIPIDEMIA: ICD-10-CM

## 2025-03-28 DIAGNOSIS — Z28.21 INFLUENZA VACCINATION DECLINED BY PATIENT: ICD-10-CM

## 2025-03-28 RX ORDER — MONTELUKAST SODIUM 10 MG/1
10 TABLET ORAL NIGHTLY PRN
Qty: 90 TABLET | Refills: 3 | Status: SHIPPED | OUTPATIENT
Start: 2025-03-28

## 2025-03-28 RX ORDER — MONTELUKAST SODIUM 10 MG/1
TABLET ORAL
Qty: 90 TABLET | Refills: 1 | OUTPATIENT
Start: 2025-03-28

## 2025-03-28 RX ORDER — METHYLPREDNISOLONE 4 MG/1
TABLET ORAL
Qty: 1 EACH | Refills: 0 | Status: SHIPPED | OUTPATIENT
Start: 2025-03-28

## 2025-03-28 NOTE — PROGRESS NOTES
Subjective:   Jacki Asencio is a 69 year old female who presents for a Medicare Wellness Visit charge within the last 11 months and Patient may not meet criteria for AWV: Please evaluate for correct coding and scheduled follow up of multiple significant but stable problems and acute uncomplicated problem.          Imms- up to date with RSV, PCV 20, Tdap, both Shingrix. Discussed flu and covid.         Cervical cancer screening-  No h/o abnormal paps, HPV, or genital warts. neg cotesting on 12/9/19 with Dr. Ashley Solorzano. No further paps needed.        Breast cancer screening- + h/o augmentation. No known family history of breast/ovarian cancer. Last normal mammogram 1/28/25.         Colon cancer screening- c-scope done 11/29/17 with Dr. Reeves, [facility], repeat 10 yrs.         Osteoporosis screening- last dexa normal on 2/3/21.        Hep C screening- non-reactive on 2/3/21.     Hearing- was told she needs to get hearing aids    POA/LW- not discussed yet, not on file    Diet/exercise- is going to get serious with exercise after her physical. Likes pilates and yoga. Has a gym set up down stairs. Bike        Dental/Eye Check up-  Recommended pt see dentist once every 6 months for a cleaning and once every year for an eye exam.        IFG- hba1c 6.1 on 1/28/25.    Vit d defic 19.8 on 1/28/25.     Lumbar back pain-  started 2 days ago while working out. Saw her chiropracter. Hasn't tried tylenol or ibuprofen. Rates pain as a 6-8/10, but worse for a short period when standing up.    Denies-numbness or tingling down her legs, leg weakness, saddle anesthesia.       History of Present Illness      History/Other:   Fall Risk Assessment:   She has been screened for Falls and is High Risk. Fall Prevention information provided to patient in After Visit Summary.    Do you feel unsteady when standing or walking?: (Patient-Rptd) No  Do you worry about falling?: (Patient-Rptd) Yes  Have you fallen in the past year?:  (Patient-Rptd) No     Cognitive Assessment:   She had a completely normal cognitive assessment - see flowsheet entries       Functional Ability/Status:   Jacki Asencio has some abnormal functions as listed below:  She has Hearing problems based on screening of functional status.      Depression Screening (PHQ):  PHQ-2 SCORE: 0  , done 3/26/2025   Last [place] Suicide Screening on 3/28/2025 was No Risk.          Advanced Directives:   She does have a Living Will but we do NOT have it on file in Epic.    She does have a POA but we do NOT have it on file in Epic.    Not discussed      Patient Active Problem List   Diagnosis    Allergic rhinitis    Esophagitis, unspecified    BPPV (benign paroxysmal positional vertigo), bilateral    Vitamin D insufficiency    Mixed hyperlipidemia    Psoriasis    IFG (impaired fasting glucose)    Trochanteric bursitis of left hip     Allergies:  She is allergic to penicillins, sulfa antibiotics, grapefruit extract, and neomycin-bacitracin-polymyxin.    Current Medications:  Outpatient Medications Marked as Taking for the 3/28/25 encounter (Office Visit) with Leyda Alcala MD   Medication Sig    montelukast 10 MG Oral Tab Take 1 tablet (10 mg total) by mouth nightly as needed (allergies).    methylPREDNISolone (MEDROL) 4 MG Oral Tablet Therapy Pack As directed.    ergocalciferol 1.25 MG (08035 UT) Oral Cap Take 1 capsule (50,000 Units total) by mouth once a week.    triamcinolone 0.1 % External Cream Apply topically 2 (two) times daily as needed.    ketoconazole 2 % External Shampoo Apply 5-10mL to wet scalp and affected areas of face/ears, lather, leave on 3-5 min, & rinse;a daily x 1 wk, then go to 2-3x/wk    Fluticasone Propionate (FLONASE ALLERGY RELIEF NA) 1 spray by Nasal route daily as needed.    clindamycin 1 % External Lotion Apply 1 Application topically 2 (two) times daily as needed (folliciulitis of legs).    clobetasol 0.05 % External Solution Apply topically 2  (two) times daily.    Daily Multiple Vitamins Oral Tab Take 1 tablet by mouth daily.       Medical History:  She  has a past medical history of Allergic rhinitis, Back pain, Constipation, COVID-19 virus infection (08/28/2020), Hemorrhoids, Hiatal hernia (01/20/2021), IFG (impaired fasting glucose) (02/03/2021), Mixed hyperlipidemia (2019), Psoriasis (9/24/2020), Trochanteric bursitis of left hip (01/11/2022), Vitamin D insufficiency (2018), and Wears glasses.  Surgical History:  She  has a past surgical history that includes implantable breast prosthesis (Right, 1990); other surgical history (2000); colonoscopy (11/29/2017); and cataract (Left, 04/14/2022).   Family History:  Her family history includes Arrhythmia in her brother; Cancer in her brother and brother; Dementia in her mother; Diabetes in her brother and sister; Heart Attack in her father; Heart Disease in her brother; High Blood Pressure in her brother, brother, and sister; No Known Problems in her maternal grandfather, maternal grandmother, paternal grandfather, and paternal grandmother.  Social History:  She  reports that she has never smoked. She has never used smokeless tobacco. She reports that she does not currently use alcohol. She reports that she does not use drugs.    Tobacco:  She has never smoked tobacco.    CAGE Alcohol Screen:   CAGE screening score of 0 on 3/26/2025, showing low risk of alcohol abuse.      Patient Care Team:  Leyda Alcala MD as PCP - General (Family Practice)  Ashley Solorzano MD (OBSTETRICS & GYNECOLOGY)  Alhaji Barrios, RUPESH as Physical Therapist    Review of Systems     Negative except L lower back pain    Objective:   Physical Exam       /70   Pulse 64   Temp 97.9 °F (36.6 °C) (Temporal)   Resp 16   Ht 5' 5\" (1.651 m)   Wt 200 lb (90.7 kg)   SpO2 97%   BMI 33.28 kg/m²  Estimated body mass index is 33.28 kg/m² as calculated from the following:    Height as of this encounter: 5' 5\" (1.651 m).     Weight as of this encounter: 200 lb (90.7 kg).      GENERAL APPEARANCE:  Alert, no acute distress, appears stated age  HEENT:  Head- Normocephalic, atraumatic.    Eyes- Extraocular movements intact, pupils equally round and reactive to light    and accommodation, conjunctivae normal.    Ears- Tympanic membranes intact bilaterally.    Nose- Patent, normal septum and turbinates.    Mouth/Throat- Normal oral mucosa, throat non-erythematous.  NECK:  No submental, submandibular, ant/post cervical lymphadenopathy. No thyromegaly or masses.  PULMONARY:  Lungs clear to auscultation bilaterally. No wheezes, rales, or rhonchi.  CARDIOVASCULAR:  Regular rate and rhythm. No murmurs, gallops, or rubs.  ABDOMEN:  + bowel sounds, soft, nontender, nondistended. No hepatomegaly.  MUSCULOSKELETAL: No kyphosis. Strength of lower extremities 5/5 bilaterally. Normal gait. Non-tender to palpation of the spine and paraspinal areas, but localizes pain to the L lumbar paraspinal area  NEUROLOGIC:  Cranial nerves 2-12 grossly intact. Unable to elicit patellar reflexes B (too tense)  PSYCHIATRIC:  Normal mood, affect, and hygiene.      Medicare Hearing Assessment:   Hearing Screening    Time taken: 3/28/2025 11:34 AM  Entry User: Leyda Alcala MD  Screening Method: Other         Visual Acuity:   Right Eye Visual Acuity: Uncorrected Right Eye Chart Acuity: 20/13   Left Eye Visual Acuity: Uncorrected Left Eye Chart Acuity: 20/13   Both Eyes Visual Acuity: Uncorrected Both Eyes Chart Acuity: 20/13   Able To Tolerate Visual Acuity: Yes        Assessment & Plan:   Jacki Asencio is a 69 year old female who presents for a Medicare Assessment.     1. Encounter for annual health examination (Primary)  2. Encounter for screening mammogram for malignant neoplasm of breast  3. Influenza vaccination declined by patient  -     High Dose Fluzone trivalent influenza, 65 yrs+ PFS [29317]  4. Vitamin D deficiency  -     Vitamin D; Future; Expected  date: 04/14/2025  5. Mixed hyperlipidemia  -     Lipid Panel; Future; Expected date: 04/14/2025  -     Comp Metabolic Panel (14); Future; Expected date: 04/14/2025  6. IFG (impaired fasting glucose)  -     CBC With Differential With Platelet; Future; Expected date: 04/14/2025  -     Comp Metabolic Panel (14); Future; Expected date: 04/14/2025  7. Non-seasonal allergic rhinitis, unspecified trigger  -     Montelukast Sodium; Take 1 tablet (10 mg total) by mouth nightly as needed (allergies).  Dispense: 90 tablet; Refill: 3  8. Lumbar strain, subsequent encounter  -     methylPREDNISolone; As directed.  Dispense: 1 each; Refill: 0    Assessment & Plan    The patient indicates understanding of these issues and agrees to the plan.  Reinforced healthy diet, lifestyle, and exercise.      Return in about 1 year (around 3/28/2026) for annual physical, we will contact you with results.     Leyda Alcala MD, 3/28/2025     Supplementary Documentation:   General Health:  In the past six months, have you lost more than 10 pounds without trying?: (Patient-Rptd) 2 - No  Has your appetite been poor?: (Patient-Rptd) No  Type of Diet: (Patient-Rptd) Other  How does the patient maintain a good energy level?: (Patient-Rptd) Appropriate Exercise  How would you describe your daily physical activity?: (Patient-Rptd) Light  How would you describe your current health state?: (Patient-Rptd) Good  How do you maintain positive mental well-being?: (Patient-Rptd) Social Interaction, Games, Visiting Friends  On a scale of 0 to 10, with 0 being no pain and 10 being severe pain, what is your pain level?: (Patient-Rptd) 1 - (Mild)  In the past six months, have you experienced urine leakage?: (Patient-Rptd) 1-Yes  At any time do you feel concerned for the safety/well-being of yourself and/or your children, in your home or elsewhere?: (Patient-Rptd) No  Have you had any immunizations at another office such as Influenza, Hepatitis B, Tetanus, or  Pneumococcal?: (Patient-Rptd) No    Health Maintenance   Topic Date Due    COVID-19 Vaccine (5 - 2024-25 season) 09/01/2024    Influenza Vaccine (1) 10/01/2024    Pap Smear  12/09/2024    Annual Physical  03/06/2025    Mammogram  01/28/2026    Colorectal Cancer Screening  11/29/2027    DEXA Scan  Completed    Annual Depression Screening  Completed    Fall Risk Screening (Annual)  Completed    Pneumococcal Vaccine: 50+ Years  Completed    Zoster Vaccines  Completed    Meningococcal B Vaccine  Aged Out

## 2025-04-25 RX ORDER — ERGOCALCIFEROL 1.25 MG/1
50000 CAPSULE, LIQUID FILLED ORAL WEEKLY
Qty: 4 CAPSULE | Refills: 2 | OUTPATIENT
Start: 2025-04-25

## 2025-04-25 NOTE — TELEPHONE ENCOUNTER
Requesting Vitamin D 50,000IU  Last OV: 3/28/25 Physical  RTC: 1 year  Last Rx'd 2/1/25 #4 with 2 refills    Future Appointments   Date Time Provider Department Center   11/6/2025  1:00 PM Melissa Allen APRN [facility]       Request denied - 12 week therapy completed.

## 2025-07-14 ENCOUNTER — OFFICE VISIT (OUTPATIENT)
Dept: FAMILY MEDICINE CLINIC | Facility: CLINIC | Age: 70
End: 2025-07-14
Payer: MEDICARE

## 2025-07-14 VITALS
WEIGHT: 192 LBS | DIASTOLIC BLOOD PRESSURE: 70 MMHG | HEIGHT: 65 IN | RESPIRATION RATE: 16 BRPM | BODY MASS INDEX: 31.99 KG/M2 | OXYGEN SATURATION: 99 % | TEMPERATURE: 98 F | SYSTOLIC BLOOD PRESSURE: 122 MMHG | HEART RATE: 72 BPM

## 2025-07-14 DIAGNOSIS — R53.83 FATIGUE, UNSPECIFIED TYPE: ICD-10-CM

## 2025-07-14 DIAGNOSIS — H10.13 ALLERGIC CONJUNCTIVITIS OF BOTH EYES: ICD-10-CM

## 2025-07-14 DIAGNOSIS — L29.2 VULVAR ITCHING: ICD-10-CM

## 2025-07-14 DIAGNOSIS — J30.89 NON-SEASONAL ALLERGIC RHINITIS, UNSPECIFIED TRIGGER: ICD-10-CM

## 2025-07-14 DIAGNOSIS — R73.01 IFG (IMPAIRED FASTING GLUCOSE): ICD-10-CM

## 2025-07-14 DIAGNOSIS — L73.9 FOLLICULITIS: ICD-10-CM

## 2025-07-14 DIAGNOSIS — E78.2 MIXED HYPERLIPIDEMIA: ICD-10-CM

## 2025-07-14 DIAGNOSIS — R25.2 MUSCLE CRAMPING: Primary | ICD-10-CM

## 2025-07-14 DIAGNOSIS — L21.9 SEBORRHEIC DERMATITIS: ICD-10-CM

## 2025-07-14 DIAGNOSIS — E55.9 VITAMIN D DEFICIENCY: ICD-10-CM

## 2025-07-14 PROCEDURE — 99214 OFFICE O/P EST MOD 30 MIN: CPT | Performed by: FAMILY MEDICINE

## 2025-07-14 PROCEDURE — G2211 COMPLEX E/M VISIT ADD ON: HCPCS | Performed by: FAMILY MEDICINE

## 2025-07-14 RX ORDER — OLOPATADINE HYDROCHLORIDE 2 MG/ML
1 SOLUTION OPHTHALMIC
Qty: 2.5 ML | Refills: 0 | Status: SHIPPED | OUTPATIENT
Start: 2025-07-14

## 2025-07-14 RX ORDER — CLINDAMYCIN PHOSPHATE 10 UG/ML
1 LOTION TOPICAL 2 TIMES DAILY PRN
Qty: 60 ML | Refills: 1 | Status: SHIPPED | OUTPATIENT
Start: 2025-07-14

## 2025-07-14 RX ORDER — TRIAMCINOLONE ACETONIDE 1 MG/G
CREAM TOPICAL
Qty: 28.4 G | Refills: 0 | Status: SHIPPED | OUTPATIENT
Start: 2025-07-14

## 2025-07-14 RX ORDER — MONTELUKAST SODIUM 10 MG/1
10 TABLET ORAL NIGHTLY PRN
Qty: 90 TABLET | Refills: 3 | Status: SHIPPED | OUTPATIENT
Start: 2025-07-14

## 2025-07-14 NOTE — PROGRESS NOTES
[facility] Visit Note  7/14/2025      Subjective:      Patient ID: Jacki Asencio is a 69 year old female.    Chief Complaint:  Chief Complaint   Patient presents with    Muscle Pain     \"all over\"    Cramps     Legs     Tired    Sinus Problem     congestion & drainage x 3 days.       HPI:  Jacki Asencio is a 69 year old female who is being seen today for the above.       History of Present Illness  Jacki Asencio is a 69 year old female who presents with muscle pain and cramping after starting a new exercise routine.    Muscle pain and cramping in her legs- rated 8 to 9 out of 10. The pain worsens with transitions from heat to air conditioning. Her muscles require a couple of days to recover post-exercise, leading to fatigue and limping. She began exercising in late June after moving on June 20th. Stress from moving and physical exertion contributes to her fatigue and muscle soreness. She has started stretching and yoga to alleviate symptoms.    Allergy symptoms- including sinus pressure and ear blockage, runny nose and sinus pressure without thick, yellow mucus, and experiences ear blockage and pressure, especially when outside. itchy and burning eyes, managed with daily Flonase and montelukast. Despite using saline and Visine eye drops (not olopatadine), symptoms persist, especially after exposure to environments like botanical gardens. Her history of vertigo has improved with Flonase, but her eyes continue to burn.                 Review of Systems - as stated above in the HPI      Objective:     Vitals:    07/14/25 1109   BP: 122/70   Pulse: 72   Resp: 16   Temp: 97.9 °F (36.6 °C)   TempSrc: Temporal   SpO2: 99%   Weight: 192 lb (87.1 kg)   Height: 5' 5\" (1.651 m)       Physical Examination   General:  Alert, in no acute distress  HEENT: NCAT, EOMI, normal TMs, oropharynx, and nasal turbinates. No pain with palpation of frontal and maxillary sinuses.  Neck:  No cervical lymphadenopathy  CV:  Regular rate and rhythm. No murmurs, gallops, or rubs.  Lungs:  Clear to auscultation B, no wheezes, rales, or rhonchi, normal respiratory effort  Abd:  +bowel sounds, soft, obese  Ext:  No pedal edema,  Pedal pulses 2+ B  Psych: normal mood, affect, and hygiene       Assessment:     1. Muscle cramping  - CK (Creatine Kinase) (Not Creatinine) (E); Future  - Comp Metabolic Panel (14); Future  - Magnesium; Future    2. Fatigue, unspecified type  - CBC With Differential With Platelet; Future  - Hemoglobin A1C; Future  - Assay, Thyroid Stim Hormone; Future    3. Allergic conjunctivitis of both eyes  - Olopatadine HCl 0.2 % Ophthalmic Solution; Apply 1 drop to eye daily as needed (conjunctivitis).  Dispense: 2.5 mL; Refill: 0    4. Folliculitis  - clindamycin 1 % External Lotion; Apply 1 Application topically 2 (two) times daily as needed (folliciulitis of legs).  Dispense: 60 mL; Refill: 1    5. Vitamin D deficiency  - Vitamin D [E]; Future    6. IFG (impaired fasting glucose)  - Hemoglobin A1C; Future    7. Seborrheic dermatitis  - triamcinolone 0.1 % External Cream; Apply topically 2 (two) times daily as needed.  Dispense: 28.4 g; Refill: 0    8. Mixed hyperlipidemia  - Comp Metabolic Panel (14); Future  - Lipid Panel; Future    9. Vulvar itching  - triamcinolone 0.1 % External Cream; Apply topically 2 (two) times daily as needed.  Dispense: 28.4 g; Refill: 0    10. Non-seasonal allergic rhinitis, unspecified trigger  - montelukast 10 MG Oral Tab; Take 1 tablet (10 mg total) by mouth nightly as needed (allergies).  Dispense: 90 tablet; Refill: 3    Assessment & Plan  Muscle cramps and pain  Significant leg muscle pain and cramping post-exercise. Possible hypokalemia or hypomagnesemia.  - Order blood tests for potassium and magnesium levels.  - Encourage stretching and yoga.    Fatigue  Fatigue possibly due to stress and physical activity. Rule out thyroid dysfunction or anemia.  - Order blood tests for thyroid  function and hemoglobin levels.    Allergic rhinitis  Ocular symptoms uncontrolled with current medications. Ineffective eye drops.  - Prescribe olopatadine eye drops.  - Advise adding Claritin if symptoms persist.  -cont montelukast and flonase  - Recommend ophthalmologist follow-up if no improvement.    General Health Maintenance  Due for routine blood tests and allergy testing as per ENT recommendation.  - Order blood tests for cholesterol and vitamin D.  - do allergy blood test through allergiest.          I am providing care as part of an ongoing, longitudinal care relationship.    Return for annual physical in March, sooner as needed.

## 2025-07-15 ENCOUNTER — LAB ENCOUNTER (OUTPATIENT)
Dept: LAB | Age: 70
End: 2025-07-15
Attending: FAMILY MEDICINE
Payer: MEDICARE

## 2025-07-15 DIAGNOSIS — E55.9 VITAMIN D DEFICIENCY: ICD-10-CM

## 2025-07-15 DIAGNOSIS — R53.83 FATIGUE, UNSPECIFIED TYPE: ICD-10-CM

## 2025-07-15 DIAGNOSIS — E78.2 MIXED HYPERLIPIDEMIA: ICD-10-CM

## 2025-07-15 DIAGNOSIS — R25.2 MUSCLE CRAMPING: ICD-10-CM

## 2025-07-15 DIAGNOSIS — R73.01 IFG (IMPAIRED FASTING GLUCOSE): ICD-10-CM

## 2025-07-15 LAB
ALBUMIN SERPL-MCNC: 4.5 G/DL (ref 3.2–4.8)
ALBUMIN/GLOB SERPL: 1.6 {RATIO} (ref 1–2)
ALP LIVER SERPL-CCNC: 72 U/L (ref 55–142)
ALT SERPL-CCNC: 21 U/L (ref 10–49)
ANION GAP SERPL CALC-SCNC: 8 MMOL/L (ref 0–18)
AST SERPL-CCNC: 22 U/L (ref ?–34)
BASOPHILS # BLD AUTO: 0.04 X10(3) UL (ref 0–0.2)
BASOPHILS NFR BLD AUTO: 0.8 %
BILIRUB SERPL-MCNC: 0.5 MG/DL (ref 0.2–1.1)
BUN BLD-MCNC: 13 MG/DL (ref 9–23)
CALCIUM BLD-MCNC: 8.8 MG/DL (ref 8.7–10.6)
CHLORIDE SERPL-SCNC: 104 MMOL/L (ref 98–112)
CHOLEST SERPL-MCNC: 215 MG/DL (ref ?–200)
CK SERPL-CCNC: 237 U/L (ref 34–145)
CO2 SERPL-SCNC: 30 MMOL/L (ref 21–32)
CREAT BLD-MCNC: 0.85 MG/DL (ref 0.55–1.02)
EGFRCR SERPLBLD CKD-EPI 2021: 75 ML/MIN/1.73M2 (ref 60–?)
EOSINOPHIL # BLD AUTO: 0.09 X10(3) UL (ref 0–0.7)
EOSINOPHIL NFR BLD AUTO: 1.8 %
ERYTHROCYTE [DISTWIDTH] IN BLOOD BY AUTOMATED COUNT: 14.5 %
FASTING PATIENT LIPID ANSWER: YES
FASTING STATUS PATIENT QL REPORTED: YES
GLOBULIN PLAS-MCNC: 2.8 G/DL (ref 2–3.5)
GLUCOSE BLD-MCNC: 112 MG/DL (ref 70–99)
HCT VFR BLD AUTO: 42.5 % (ref 35–48)
HDLC SERPL-MCNC: 49 MG/DL (ref 40–59)
HGB BLD-MCNC: 14.2 G/DL (ref 12–16)
IMM GRANULOCYTES # BLD AUTO: 0.01 X10(3) UL (ref 0–1)
IMM GRANULOCYTES NFR BLD: 0.2 %
LDLC SERPL CALC-MCNC: 142 MG/DL (ref ?–100)
LYMPHOCYTES # BLD AUTO: 1.68 X10(3) UL (ref 1–4)
LYMPHOCYTES NFR BLD AUTO: 33.7 %
MAGNESIUM SERPL-MCNC: 2.1 MG/DL (ref 1.6–2.6)
MCH RBC QN AUTO: 30.5 PG (ref 26–34)
MCHC RBC AUTO-ENTMCNC: 33.4 G/DL (ref 31–37)
MCV RBC AUTO: 91.2 FL (ref 80–100)
MONOCYTES # BLD AUTO: 0.46 X10(3) UL (ref 0.1–1)
MONOCYTES NFR BLD AUTO: 9.2 %
NEUTROPHILS # BLD AUTO: 2.71 X10 (3) UL (ref 1.5–7.7)
NEUTROPHILS # BLD AUTO: 2.71 X10(3) UL (ref 1.5–7.7)
NEUTROPHILS NFR BLD AUTO: 54.3 %
NONHDLC SERPL-MCNC: 166 MG/DL (ref ?–130)
OSMOLALITY SERPL CALC.SUM OF ELEC: 295 MOSM/KG (ref 275–295)
PLATELET # BLD AUTO: 291 10(3)UL (ref 150–450)
POTASSIUM SERPL-SCNC: 4.2 MMOL/L (ref 3.5–5.1)
PROT SERPL-MCNC: 7.3 G/DL (ref 5.7–8.2)
RBC # BLD AUTO: 4.66 X10(6)UL (ref 3.8–5.3)
SODIUM SERPL-SCNC: 142 MMOL/L (ref 136–145)
TRIGL SERPL-MCNC: 134 MG/DL (ref 30–149)
TSI SER-ACNC: 2.05 UIU/ML (ref 0.55–4.78)
VIT D+METAB SERPL-MCNC: 32.8 NG/ML (ref 30–100)
VIT D+METAB SERPL-MCNC: 33.3 NG/ML (ref 30–100)
VLDLC SERPL CALC-MCNC: 25 MG/DL (ref 0–30)
WBC # BLD AUTO: 5 X10(3) UL (ref 4–11)

## 2025-07-15 PROCEDURE — 82306 VITAMIN D 25 HYDROXY: CPT

## 2025-07-15 PROCEDURE — 80061 LIPID PANEL: CPT

## 2025-07-15 PROCEDURE — 85025 COMPLETE CBC W/AUTO DIFF WBC: CPT

## 2025-07-15 PROCEDURE — 83036 HEMOGLOBIN GLYCOSYLATED A1C: CPT

## 2025-07-15 PROCEDURE — 36415 COLL VENOUS BLD VENIPUNCTURE: CPT

## 2025-07-15 PROCEDURE — 80053 COMPREHEN METABOLIC PANEL: CPT

## 2025-07-15 PROCEDURE — 84443 ASSAY THYROID STIM HORMONE: CPT

## 2025-07-15 PROCEDURE — 83735 ASSAY OF MAGNESIUM: CPT

## 2025-07-15 PROCEDURE — 82550 ASSAY OF CK (CPK): CPT

## 2025-07-16 LAB
EST. AVERAGE GLUCOSE BLD GHB EST-MCNC: 134 MG/DL (ref 68–126)
HBA1C MFR BLD: 6.3 % (ref ?–5.7)

## (undated) DIAGNOSIS — J30.89 NON-SEASONAL ALLERGIC RHINITIS DUE TO OTHER ALLERGIC TRIGGER: ICD-10-CM

## (undated) NOTE — LETTER
Jeff Warner, :1955    CONSENT FOR PROCEDURE/SEDATION    1. I authorize the performance upon Jeff Warner  the following: Endometrial Biopsy    2.  I authorize Dr. Carmen Weston MD (and whomever is designated as the doctor’s assistant), t Witness: _________________________________________ Date:___________     Physician Signature: _______________________________ Date:___________

## (undated) NOTE — ED AVS SNAPSHOT
Jairo Mayers   MRN: [id number]    Department:  [facility] Emergency Department in [place]   Date of Visit:  11/12/2019           Disclosure     Insurance plans vary and the physician(s) referred by the ER may not be covered by your plan.  Please conta tell this physician (or your personal doctor if your instructions are to return to your personal doctor) about any new or lasting problems. The primary care or specialist physician will see patients referred from the [facility] Emergency Department.  Spring Gambino

## (undated) NOTE — MR AVS SNAPSHOT
[facility] [address]  [phone]               Thank you for choosing us for your health care visit with Emmanuel Cervantes MD.  We are glad to serve you and happy to provide you with Clindamycin Phosphate 1 % Soln   APPLY TO POSTERIOR THIGHS EVERY DAY AFTER SHOWERING   Commonly known as:  CLEOCIN T           Clobetasol Propionate 0.05 % Soln   APPLY A FEW DROPS AA OF SCLAP QHS PRN   Commonly known as:  TEMOVATE           clotrimazole- Don’t eat while distracted and slow down. Avoid over sized portions. Don’t eat while when you’re bored.      EAT THESE FOODS MORE OFTEN: EAT THESE FOODS LESS OFTEN:   Make half your plate fruits and vegetables Highly refined, white starches including wh